# Patient Record
Sex: FEMALE | Race: WHITE | Employment: UNEMPLOYED | URBAN - METROPOLITAN AREA
[De-identification: names, ages, dates, MRNs, and addresses within clinical notes are randomized per-mention and may not be internally consistent; named-entity substitution may affect disease eponyms.]

---

## 2019-02-05 ENCOUNTER — OFFICE VISIT (OUTPATIENT)
Dept: SLEEP MEDICINE | Age: 24
End: 2019-02-05
Payer: MEDICAID

## 2019-02-05 VITALS
BODY MASS INDEX: 24.55 KG/M2 | SYSTOLIC BLOOD PRESSURE: 93 MMHG | DIASTOLIC BLOOD PRESSURE: 64 MMHG | WEIGHT: 130 LBS | HEIGHT: 61 IN | OXYGEN SATURATION: 96 % | HEART RATE: 75 BPM

## 2019-02-05 DIAGNOSIS — G47.21 SLEEP PHASE SYNDROME, DELAYED: ICD-10-CM

## 2019-02-05 DIAGNOSIS — G47.26 SHIFT WORK SLEEP DISORDER: Primary | ICD-10-CM

## 2019-02-05 PROCEDURE — G8420 CALC BMI NORM PARAMETERS: HCPCS | Performed by: PSYCHIATRY & NEUROLOGY

## 2019-02-05 PROCEDURE — 4004F PT TOBACCO SCREEN RCVD TLK: CPT | Performed by: PSYCHIATRY & NEUROLOGY

## 2019-02-05 PROCEDURE — G8484 FLU IMMUNIZE NO ADMIN: HCPCS | Performed by: PSYCHIATRY & NEUROLOGY

## 2019-02-05 PROCEDURE — 99204 OFFICE O/P NEW MOD 45 MIN: CPT | Performed by: PSYCHIATRY & NEUROLOGY

## 2019-02-05 PROCEDURE — G8427 DOCREV CUR MEDS BY ELIG CLIN: HCPCS | Performed by: PSYCHIATRY & NEUROLOGY

## 2019-02-05 RX ORDER — MODAFINIL 200 MG/1
200 TABLET ORAL DAILY
Qty: 30 TABLET | Refills: 5 | Status: SHIPPED | OUTPATIENT
Start: 2019-02-05 | End: 2019-05-07

## 2019-02-05 ASSESSMENT — ENCOUNTER SYMPTOMS
GASTROINTESTINAL NEGATIVE: 1
ALLERGIC/IMMUNOLOGIC NEGATIVE: 1
CHOKING: 1
EYES NEGATIVE: 1

## 2019-02-05 ASSESSMENT — SLEEP AND FATIGUE QUESTIONNAIRES
HOW LIKELY ARE YOU TO NOD OFF OR FALL ASLEEP WHEN YOU ARE A PASSENGER IN A CAR FOR AN HOUR WITHOUT A BREAK: 0
HOW LIKELY ARE YOU TO NOD OFF OR FALL ASLEEP IN A CAR, WHILE STOPPED FOR A FEW MINUTES IN TRAFFIC: 0
ESS TOTAL SCORE: 6
HOW LIKELY ARE YOU TO NOD OFF OR FALL ASLEEP WHILE SITTING AND TALKING TO SOMEONE: 0
HOW LIKELY ARE YOU TO NOD OFF OR FALL ASLEEP WHILE SITTING QUIETLY AFTER LUNCH WITHOUT ALCOHOL: 0
HOW LIKELY ARE YOU TO NOD OFF OR FALL ASLEEP WHILE SITTING AND READING: 0
HOW LIKELY ARE YOU TO NOD OFF OR FALL ASLEEP WHILE WATCHING TV: 1
HOW LIKELY ARE YOU TO NOD OFF OR FALL ASLEEP WHILE SITTING INACTIVE IN A PUBLIC PLACE: 2
HOW LIKELY ARE YOU TO NOD OFF OR FALL ASLEEP WHILE LYING DOWN TO REST IN THE AFTERNOON WHEN CIRCUMSTANCES PERMIT: 3

## 2019-05-06 ENCOUNTER — TELEPHONE (OUTPATIENT)
Dept: PULMONOLOGY | Age: 24
End: 2019-05-06

## 2019-05-07 ENCOUNTER — HOSPITAL ENCOUNTER (EMERGENCY)
Age: 24
Discharge: HOME | End: 2019-05-07
Attending: EMERGENCY MEDICINE
Payer: MEDICAID

## 2019-05-07 VITALS
HEART RATE: 97 BPM | BODY MASS INDEX: 24.55 KG/M2 | HEIGHT: 61 IN | DIASTOLIC BLOOD PRESSURE: 72 MMHG | OXYGEN SATURATION: 99 % | WEIGHT: 130 LBS | RESPIRATION RATE: 17 BRPM | TEMPERATURE: 97.4 F | SYSTOLIC BLOOD PRESSURE: 114 MMHG

## 2019-05-07 DIAGNOSIS — R10.2 ACUTE PELVIC PAIN, FEMALE: Primary | ICD-10-CM

## 2019-05-07 LAB
BACTERIA WET PREP: ABNORMAL
BACTERIA: ABNORMAL /HPF
BILIRUBIN URINE: ABNORMAL
BLOOD, URINE: ABNORMAL
CLARITY: CLEAR
CLUE CELLS: ABNORMAL
COLOR: YELLOW
EPITHELIAL CELLS WET PREP: ABNORMAL
EPITHELIAL CELLS, UA: ABNORMAL /HPF
GLUCOSE URINE: NEGATIVE MG/DL
HCG(URINE) PREGNANCY TEST: NEGATIVE
KETONES, URINE: 15 MG/DL
LEUKOCYTE ESTERASE, URINE: NEGATIVE
MICROSCOPIC EXAMINATION: YES
MUCUS: ABNORMAL /LPF
NITRITE, URINE: NEGATIVE
PH UA: 8.5 (ref 5–8)
PROTEIN UA: 30 MG/DL
RBC UA: ABNORMAL /HPF (ref 0–2)
RBC WET PREP: ABNORMAL
SOURCE WET PREP: ABNORMAL
SPECIFIC GRAVITY UA: 1.02 (ref 1–1.03)
TRICHOMONAS PREP: ABNORMAL
URINE REFLEX TO CULTURE: ABNORMAL
URINE TYPE: ABNORMAL
UROBILINOGEN, URINE: 2 E.U./DL
WBC UA: ABNORMAL /HPF (ref 0–5)
WBC WET PREP: ABNORMAL
YEAST WET PREP: ABNORMAL

## 2019-05-07 PROCEDURE — 99284 EMERGENCY DEPT VISIT MOD MDM: CPT

## 2019-05-07 PROCEDURE — 87591 N.GONORRHOEAE DNA AMP PROB: CPT

## 2019-05-07 PROCEDURE — 87491 CHLMYD TRACH DNA AMP PROBE: CPT

## 2019-05-07 PROCEDURE — 81001 URINALYSIS AUTO W/SCOPE: CPT

## 2019-05-07 PROCEDURE — 84703 CHORIONIC GONADOTROPIN ASSAY: CPT

## 2019-05-07 PROCEDURE — 87210 SMEAR WET MOUNT SALINE/INK: CPT

## 2019-05-07 RX ORDER — ALBUTEROL SULFATE 90 UG/1
2 AEROSOL, METERED RESPIRATORY (INHALATION)
COMMUNITY
Start: 2018-09-28 | End: 2020-03-28 | Stop reason: SDUPTHER

## 2019-05-07 RX ORDER — MIRTAZAPINE 15 MG/1
7.5 TABLET, FILM COATED ORAL
COMMUNITY
Start: 2019-04-05 | End: 2021-08-24

## 2019-05-07 RX ORDER — BUPROPION HYDROCHLORIDE 150 MG/1
150 TABLET ORAL
COMMUNITY
Start: 2019-04-05 | End: 2020-03-28 | Stop reason: SDUPTHER

## 2019-05-07 RX ORDER — CETIRIZINE HYDROCHLORIDE 10 MG/1
10 TABLET ORAL
COMMUNITY
Start: 2018-11-30 | End: 2020-03-28 | Stop reason: SDUPTHER

## 2019-05-07 ASSESSMENT — PAIN SCALES - GENERAL
PAINLEVEL_OUTOF10: 0

## 2019-05-07 NOTE — ED TRIAGE NOTES
Patient presents with complains of pain after intercourse at least 3 times in the past month. The pain lasts about 20 to 30 minutes and then stops as sudden as it started. Describes the pain as sharp cramps. She is concerned about the placement of her IUD. She has had the same sexual partner over the past year. No abnormal discharge or odor.

## 2019-05-07 NOTE — ED PROVIDER NOTES
(ZYRTEC) 10 MG TABLET    Take 10 mg by mouth    FLUTICASONE (FLONASE) 50 MCG/ACT NASAL SPRAY    1 spray by Nasal route daily    LAMOTRIGINE (LAMICTAL XR) 50 MG EXTENDED RELEASE TABLET    Take 50 mg by mouth daily    MIRTAZAPINE (REMERON) 15 MG TABLET    Take 7.5 mg by mouth    RANITIDINE HCL (ZANTAC PO)    Take by mouth       ALLERGIES     Patient has no known allergies. FAMILY HISTORY     History reviewed. No pertinent family history. No family status information on file. SOCIAL HISTORY      reports that she has been smoking. She has been smoking about 1.00 pack per day. She has never used smokeless tobacco. She reports that she drinks alcohol. She reports that she has current or past drug history. Drug: Marijuana. PHYSICAL EXAM    (up to 7 for level 4, 8 or more for level 5)     ED Triage Vitals [05/07/19 1625]   BP Temp Temp Source Pulse Resp SpO2 Height Weight   114/72 97.4 °F (36.3 °C) Oral 97 17 99 % 5' 1\" (1.549 m) 130 lb (59 kg)       Gen. : Alert well-appearing female in no acute distress. Head: Atraumatic and normocephalic. Eyes: No conjunctival injection. Pupils equal round reactive. ENT: Mahi Gordon is clear. Oropharynx is moist without erythema. Heart: Regular rate and rhythm. No murmurs gallops noted. Lungs: Breath sounds equal bilaterally and clear. Abdomen: Soft, nondistended, nontender. No masses organomegaly. Pelvic exam external genitalia is normal. Vaginal vault contains a very small amount of bloody mucus. The os is closed. The cervix is nontender. The uterus is nontender and not enlarged. The adnexa are nontender without masses. IUD strings are noted be in the normal position in the cervical os. Muscle skeletal: No extremity edema. Skin: Warm and dry, good turgor. No pallor. Neuro: Awake alert oriented. No focal motor deficits. Normal gait. Mental status: Normal affect.       DIAGNOSTIC RESULTS     RADIOLOGY:   Non-plain film images such as CT, Ultrasound and MRI are read by the radiologist. Isaías Sherman radiographic images are visualized and preliminarily interpreted by Mackenzie Saunders MD with the below findings:      Interpretation per the Radiologist below, if available at the time of this note:    No orders to display       LABS:  Labs Reviewed   WET PREP, GENITAL - Abnormal; Notable for the following components:       Result Value    Clue Cells, Wet Prep <1+ (*)     All other components within normal limits    Narrative:     Performed at:  [facility]  [address] [facility]   Phone [phone]   URINE RT REFLEX TO CULTURE - Abnormal; Notable for the following components:    Bilirubin Urine SMALL (*)     Ketones, Urine 15 (*)     Blood, Urine SMALL (*)     pH, UA 8.5 (*)     Protein, UA 30 (*)     Urobilinogen, Urine 2.0 (*)     All other components within normal limits    Narrative:     Performed at:  [facility]) [facility]  [address] [facility]   Phone [phone]   PREGNANCY, URINE    Narrative:     Performed at:  [place] [address] [facility]   Phone [phone]   MICROSCOPIC URINALYSIS       All other labs were within normal range or not returned as of this dictation. EMERGENCY DEPARTMENT COURSE and DIFFERENTIAL DIAGNOSIS/MDM:   Vitals:    Vitals:    05/07/19 1625   BP: 114/72   Pulse: 97   Resp: 17   Temp: 97.4 °F (36.3 °C)   TempSrc: Oral   SpO2: 99%   Weight: 130 lb (59 kg)   Height: 5' 1\" (1.549 m)       Differential includes but is not limited to post-orgasm/coital uterine cramps, urinary tract infection, pelvic inflammatory disease, uterine fibroids, Ectopic pregnancy, endometriosis, displaced IUD. Her pregnancy test is negative. I have a low index suspicion for ectopic pregnancy.  She has no evidence of uterine fibroids on exam. She has no palpable adnexal tenderness, no adnexal masses. Her exam does not show any significant discharge that would indicate infection/PID. Her wet prep is unremarkable. There is no evidence of urinary tract infection. The etiology of her postcoital pelvic pain is unclear. I did discuss the possibility that she could have intermittent torsion. I have asked her to return if her pain recurs and last more than 45-60 minutes. I have asked her to follow up with her primary care provider/gynecologist. I told her that they may want to do further workup including a pelvic ultrasound to rule out uterine fibroids, ovarian cyst, or other pelvic abnormalities. Her DNA probe is pending, I discussed this with the patient. If it's positive she'll be contacted for appropriate treatment. She's having no pain at all at this time. She has no tenderness. She'll be discharged home in stable condition. PROCEDURES:  None    FINAL IMPRESSION      1.  Acute pelvic pain, female          DISPOSITION/PLAN   DISPOSITION Decision To Discharge 05/07/2019 05:10:10 PM      PATIENT REFERRED TO:  Noe Lopez    In 1 week        DISCHARGE MEDICATIONS:  New Prescriptions    No medications on file       (Please note that portions of this note were completed with a voice recognition program.  Efforts were made to edit the dictations but occasionally words are mis-transcribed.)    Aravind Julio MD  Attending Emergency Physician        Vivica Gaucher, MD  05/07/19 3522

## 2019-05-08 LAB
C TRACH DNA GENITAL QL NAA+PROBE: NEGATIVE
N. GONORRHOEAE DNA: NEGATIVE

## 2019-05-22 ENCOUNTER — HOSPITAL ENCOUNTER (EMERGENCY)
Age: 24
Discharge: HOME | End: 2019-05-22
Attending: EMERGENCY MEDICINE
Payer: MEDICAID

## 2019-05-22 VITALS
HEIGHT: 61 IN | TEMPERATURE: 98.7 F | HEART RATE: 94 BPM | SYSTOLIC BLOOD PRESSURE: 121 MMHG | RESPIRATION RATE: 14 BRPM | OXYGEN SATURATION: 100 % | DIASTOLIC BLOOD PRESSURE: 80 MMHG | BODY MASS INDEX: 23.18 KG/M2 | WEIGHT: 122.8 LBS

## 2019-05-22 DIAGNOSIS — H60.503 ACUTE OTITIS EXTERNA OF BOTH EARS, UNSPECIFIED TYPE: ICD-10-CM

## 2019-05-22 DIAGNOSIS — H72.92 TYMPANIC MEMBRANE PERFORATION, LEFT: ICD-10-CM

## 2019-05-22 DIAGNOSIS — H65.03 BILATERAL ACUTE SEROUS OTITIS MEDIA, RECURRENCE NOT SPECIFIED: Primary | ICD-10-CM

## 2019-05-22 DIAGNOSIS — H69.83 DYSFUNCTION OF BOTH EUSTACHIAN TUBES: ICD-10-CM

## 2019-05-22 DIAGNOSIS — J41.0 SMOKERS' COUGH (HCC): ICD-10-CM

## 2019-05-22 PROCEDURE — 6370000000 HC RX 637 (ALT 250 FOR IP): Performed by: EMERGENCY MEDICINE

## 2019-05-22 PROCEDURE — 99282 EMERGENCY DEPT VISIT SF MDM: CPT

## 2019-05-22 RX ORDER — AMOXICILLIN 250 MG/1
500 CAPSULE ORAL ONCE
Status: COMPLETED | OUTPATIENT
Start: 2019-05-22 | End: 2019-05-22

## 2019-05-22 RX ORDER — NAPROXEN 500 MG/1
500 TABLET ORAL 2 TIMES DAILY
Qty: 14 TABLET | Refills: 0 | Status: SHIPPED | OUTPATIENT
Start: 2019-05-22 | End: 2019-08-03 | Stop reason: ALTCHOICE

## 2019-05-22 RX ORDER — NAPROXEN 250 MG/1
500 TABLET ORAL ONCE
Status: COMPLETED | OUTPATIENT
Start: 2019-05-22 | End: 2019-05-22

## 2019-05-22 RX ORDER — NEOMYCIN SULFATE, POLYMYXIN B SULFATE AND HYDROCORTISONE 10; 3.5; 1 MG/ML; MG/ML; [USP'U]/ML
3 SUSPENSION/ DROPS AURICULAR (OTIC) 3 TIMES DAILY
Qty: 1 BOTTLE | Refills: 0 | Status: SHIPPED | OUTPATIENT
Start: 2019-05-22 | End: 2019-05-29

## 2019-05-22 RX ORDER — BENZONATATE 200 MG/1
200 CAPSULE ORAL 3 TIMES DAILY PRN
Qty: 30 CAPSULE | Refills: 0 | Status: SHIPPED | OUTPATIENT
Start: 2019-05-22 | End: 2019-05-29

## 2019-05-22 RX ORDER — LANOLIN ALCOHOL/MO/W.PET/CERES
3 CREAM (GRAM) TOPICAL
COMMUNITY
Start: 2018-10-26

## 2019-05-22 RX ORDER — AMOXICILLIN 500 MG/1
500 CAPSULE ORAL 2 TIMES DAILY
Qty: 20 CAPSULE | Refills: 0 | Status: SHIPPED | OUTPATIENT
Start: 2019-05-22 | End: 2019-06-01

## 2019-05-22 RX ADMIN — NAPROXEN 500 MG: 250 TABLET ORAL at 08:00

## 2019-05-22 RX ADMIN — AMOXICILLIN 500 MG: 250 CAPSULE ORAL at 08:00

## 2019-05-22 ASSESSMENT — PAIN DESCRIPTION - ONSET: ONSET: AWAKENED FROM SLEEP

## 2019-05-22 ASSESSMENT — PAIN DESCRIPTION - FREQUENCY: FREQUENCY: CONTINUOUS

## 2019-05-22 ASSESSMENT — ENCOUNTER SYMPTOMS
DIARRHEA: 0
VOMITING: 0
ABDOMINAL PAIN: 0
SHORTNESS OF BREATH: 0
TROUBLE SWALLOWING: 0
NAUSEA: 0
VOICE CHANGE: 0

## 2019-05-22 ASSESSMENT — PAIN - FUNCTIONAL ASSESSMENT
PAIN_FUNCTIONAL_ASSESSMENT: 0-10
PAIN_FUNCTIONAL_ASSESSMENT: PREVENTS OR INTERFERES SOME ACTIVE ACTIVITIES AND ADLS
PAIN_FUNCTIONAL_ASSESSMENT: PREVENTS OR INTERFERES SOME ACTIVE ACTIVITIES AND ADLS

## 2019-05-22 ASSESSMENT — PAIN SCALES - GENERAL
PAINLEVEL_OUTOF10: 6

## 2019-05-22 ASSESSMENT — PAIN DESCRIPTION - DESCRIPTORS
DESCRIPTORS: SHARP;CONSTANT
DESCRIPTORS: SHARP;CONSTANT

## 2019-05-22 ASSESSMENT — PAIN DESCRIPTION - ORIENTATION
ORIENTATION: RIGHT;LEFT
ORIENTATION: RIGHT;LEFT

## 2019-05-22 ASSESSMENT — PAIN DESCRIPTION - LOCATION
LOCATION: EAR
LOCATION: EAR

## 2019-05-22 ASSESSMENT — PAIN DESCRIPTION - PAIN TYPE: TYPE: ACUTE PAIN

## 2019-05-22 NOTE — ED PROVIDER NOTES
[facility]  eMERGENCY dEPARTMENT eNCOUnter        Pt Name: Osvaldo Merlin  MRN: [id number]  [place] 1995  Date of evaluation: 5/22/2019  Provider: Ting Murphy MD  PCP: [address] [facility]  ED Attending: No att. providers found    [address] [place] [address]       Chief Complaint   Patient presents with    Ear Drainage     left ear drainage/popped since SUnday, right ear pain since last night       HISTORY OF PRESENT ILLNESS   (Location/Symptom, Timing/Onset, Context/Setting, Quality, Duration, Modifying Factors, Severity)  Note limiting factors. Osvaldo Merlin is a 21 y.o. female     Information obtained from patient, nursing staff, chart. Pain level VI/X  Pain medication offer. Patient comes in with a long history of having ear problems. She states that her left ear perfect and drained on Sunday. Then she states that her right ear started hurting and feeling pressure. Patient has had multiple ear infections in the past. She also has some tragal tenderness with this. No bleeding currently. Slight decrease in hearing bilaterally. Otherwise no other complaints noted. See chart for details. History of PE tubes in ears. Nursing Notes were all reviewed and agreed with or any disagreements were addressed  in the HPI. REVIEW OF SYSTEMS    (2-9 systems for level 4, 10 or more for level 5)     Review of Systems   Constitutional: Negative for chills and fever. HENT: Positive for ear discharge, ear pain, hearing loss and tinnitus. Negative for drooling, nosebleeds, trouble swallowing and voice change. Respiratory: Negative for shortness of breath. Cardiovascular: Negative for chest pain. Gastrointestinal: Negative for abdominal pain, diarrhea, nausea and vomiting. Musculoskeletal: Negative for neck pain and neck stiffness. Skin: Negative for rash. Neurological: Negative for dizziness, speech difficulty and light-headedness.    Hematological: Negative for adenopathy. Psychiatric/Behavioral: The patient is not nervous/anxious. Positives and Pertinent negatives as per HPI. Except as noted abovein the ROS, all other systems were reviewed and negative. PAST MEDICAL HISTORY     Past Medical History:   Diagnosis Date    ADHD (attention deficit hyperactivity disorder)     Asthma          SURGICAL HISTORY     Past Surgical History:   Procedure Laterality Date    TYMPANOSTOMY TUBE PLACEMENT           [address]       Discharge Medication List as of 5/22/2019  8:06 AM      CONTINUE these medications which have NOT CHANGED    Details   melatonin 3 MG TABS tablet Take 3 mg by mouthHistorical Med      cetirizine (ZYRTEC) 10 MG tablet Take 10 mg by mouthHistorical Med      albuterol sulfate HFA (VENTOLIN HFA) 108 (90 Base) MCG/ACT inhaler Inhale 2 puffs into the lungsHistorical Med      buPROPion (WELLBUTRIN XL) 150 MG extended release tablet Take 150 mg by mouthHistorical Med      mirtazapine (REMERON) 15 MG tablet Take 7.5 mg by mouthHistorical Med      lamoTRIgine (LAMICTAL XR) 50 MG extended release tablet Take 50 mg by mouth dailyHistorical Med      RaNITidine HCl (ZANTAC PO) Take by mouthHistorical Med      fluticasone (FLONASE) 50 MCG/ACT nasal spray 1 spray by Nasal route dailyHistorical Med               ALLERGIES     Patient has no known allergies. FAMILYHISTORY     History reviewed. No pertinent family history.        SOCIAL HISTORY       Social History     Socioeconomic History    Marital status: Single     Spouse name: None    Number of children: None    Years of education: None    Highest education level: None   Occupational History    None   Social Needs    Financial resource strain: None    Food insecurity:     Worry: None     Inability: None    Transportation needs:     Medical: None     Non-medical: None   Tobacco Use    Smoking status: Current Every Day Smoker     Packs/day: 1.00    Smokeless tobacco: Never Used Substance and Sexual Activity    Alcohol use: Yes     Comment: once a month    Drug use: Yes     Types: Marijuana     Comment: not use marijuana for a long time    Sexual activity: Yes     Partners: Male   Lifestyle    Physical activity:     Days per week: None     Minutes per session: None    Stress: None   Relationships    Social connections:     Talks on phone: None     Gets together: None     Attends [employer] service: None     Active member of club or organization: None     Attends meetings of clubs or organizations: None     Relationship status: None    Intimate partner violence:     Fear of current or ex partner: None     Emotionally abused: None     Physically abused: None     Forced sexual activity: None   Other Topics Concern    None   Social History Narrative    ** Merged History Encounter **            SCREENINGS             PHYSICAL EXAM    (up to 7 for level 4, 8 or more for level 5)     ED Triage Vitals [05/22/19 0733]   BP Temp Temp Source Pulse Resp SpO2 Height Weight   121/80 98.7 °F (37.1 °C) Oral 94 14 100 % 5' 1\" (1.549 m) 122 lb 12.7 oz (55.7 kg)       Physical Exam   Constitutional: She is oriented to person, place, and time. She appears well-developed and well-nourished. HENT:   Head: Normocephalic and atraumatic. Nose: Nose normal.   Mouth/Throat: Oropharynx is clear and moist.   The patient has bilateral serous otitis media but may have a perforation on the left-hand side. Patient stated already drained. I do not see any signs of any drainage currently. Patient also has some popping in the ears so she probably most likely has some eustachian tube dysfunction. Otitis externa. Bilateral.   Eyes: Pupils are equal, round, and reactive to light. Conjunctivae and EOM are normal.   Neck: Normal range of motion. Neck supple. Cardiovascular: Normal rate, regular rhythm, normal heart sounds and intact distal pulses.    Pulmonary/Chest: Effort normal and breath sounds normal. Abdominal: Soft. Bowel sounds are normal.   Musculoskeletal: Normal range of motion. Neurological: She is alert and oriented to person, place, and time. She has normal reflexes. Skin: Skin is warm and dry. Capillary refill takes less than 2 seconds. Psychiatric: She has a normal mood and affect. Nursing note and vitals reviewed. DIAGNOSTIC RESULTS   LABS:    Labs Reviewed - No data to display    All other labs were within normal range or not returned as of this dictation. EKG: All EKG's are interpreted by the Emergency Department Physician who either signs orCo-signs this chart in the absence of a cardiologist.  Please see their note for interpretation of EKG. RADIOLOGY:   Non-plain film images such as CT, Ultrasound and MRI are read by the radiologist. Plain radiographic images are visualized andpreliminarily interpreted by the  ED Provider with the below findings:        Interpretation per the Radiologist below, if available at the time ofthis note:    No orders to display     No results found. PROCEDURES   Unless otherwise noted below, none     Procedures    CRITICAL CARE TIME   N/A    CONSULTS:  None      EMERGENCY DEPARTMENT COURSE and DIFFERENTIAL DIAGNOSIS/MDM:   Vitals:    Vitals:    05/22/19 0733   BP: 121/80   Pulse: 94   Resp: 14   Temp: 98.7 °F (37.1 °C)   TempSrc: Oral   SpO2: 100%   Weight: 122 lb 12.7 oz (55.7 kg)   Height: 5' 1\" (1.549 m)       Patient was given the following medications:  Medications   amoxicillin (AMOXIL) capsule 500 mg (500 mg Oral Given 5/22/19 0800)   naproxen (NAPROSYN) tablet 500 mg (500 mg Oral Given 5/22/19 0800)         Otitis media versus otitis externa versus perforation versus tendinitis versus eustachian tube dysfunction versus smoker's cough    Patient has a long history of having ear problems.  Comes in eye has bilateral otitis media as long with irritated external auditory canal. She's been having some eustachian tube dysfunction she's been [id number] otic suspension Place 3 drops into the right ear 3 times daily for 7 days, Disp-1 Bottle, R-0Print             DISCONTINUED MEDICATIONS:  Discharge Medication List as of 5/22/2019  8:06 AM                 (Please note that portions of this note were completed with a voice recognition program.  Efforts were made to edit the dictations but occasionally words aremis-transcribed.)    Brent Tolentino MD (electronically signed)          Myles Denton MD  05/22/19 0151

## 2019-05-22 NOTE — ED NOTES
Discharge and education instructions reviewed. Patient verbalized understanding, teach back successful. Patient denied questions at this time. Instructed to follow up with PCP and or return to ED if symptoms worsen.         Ainsley Hernandez RN  05/22/19 9580

## 2019-08-03 ENCOUNTER — HOSPITAL ENCOUNTER (EMERGENCY)
Age: 24
Discharge: HOME | End: 2019-08-03
Attending: EMERGENCY MEDICINE
Payer: MEDICAID

## 2019-08-03 VITALS
DIASTOLIC BLOOD PRESSURE: 61 MMHG | HEART RATE: 80 BPM | RESPIRATION RATE: 16 BRPM | WEIGHT: 126.1 LBS | SYSTOLIC BLOOD PRESSURE: 109 MMHG | HEIGHT: 61 IN | OXYGEN SATURATION: 98 % | TEMPERATURE: 98.1 F | BODY MASS INDEX: 23.81 KG/M2

## 2019-08-03 DIAGNOSIS — R25.2 BILATERAL LEG CRAMPS: Primary | ICD-10-CM

## 2019-08-03 PROCEDURE — 99283 EMERGENCY DEPT VISIT LOW MDM: CPT

## 2019-08-03 RX ORDER — CYCLOBENZAPRINE HCL 10 MG
10 TABLET ORAL 3 TIMES DAILY PRN
Qty: 15 TABLET | Refills: 0 | Status: SHIPPED | OUTPATIENT
Start: 2019-08-03 | End: 2019-08-08

## 2019-08-03 RX ORDER — CYCLOBENZAPRINE HCL 10 MG
10 TABLET ORAL 3 TIMES DAILY PRN
Qty: 15 TABLET | Refills: 0 | Status: SHIPPED | OUTPATIENT
Start: 2019-08-03 | End: 2019-08-03 | Stop reason: SDUPTHER

## 2019-08-03 ASSESSMENT — PAIN DESCRIPTION - ORIENTATION: ORIENTATION: RIGHT;LEFT

## 2019-08-03 ASSESSMENT — PAIN DESCRIPTION - DESCRIPTORS: DESCRIPTORS: ACHING

## 2019-08-03 ASSESSMENT — PAIN SCALES - GENERAL
PAINLEVEL_OUTOF10: 2
PAINLEVEL_OUTOF10: 2

## 2019-08-03 ASSESSMENT — PAIN DESCRIPTION - LOCATION: LOCATION: LEG

## 2019-08-03 ASSESSMENT — PAIN DESCRIPTION - PAIN TYPE: TYPE: ACUTE PAIN

## 2019-08-03 NOTE — ED PROVIDER NOTES
[facility]  eMERGENCY dEPARTMENT eNCOUnter      Pt Name: Jennifer Cormier  MRN: [id number]  [place] 1995  Date of evaluation: 8/3/2019  Provider: MD Jennifer Fiore       Chief Complaint   Patient presents with    Leg Pain     bilateral leg pain since running out of Lamictal 10 days ago         HISTORY OF PRESENT ILLNESS  (Location/Symptom, Timing/Onset, Context/Setting, Quality, Duration, Modifying Factors, Severity.)   Jennifer Cormier is a 21 y.o. female who presents to the emergency department complaining of bilateral leg cramps since she ran out of her Lamictal.  She is on Lamictal for depression. She states when she starts Lamictal she gets leg cramps for a few days, and when she runs out of it she gets leg cramps. She is waiting for her insurance to reinstate this week to refill her Lamictal, but in the meantime she is having the leg cramps that she typically gets when she runs out of her medication. No injury. No swelling. No other complaints. Nursing Notes were reviewed and I agree. REVIEW OF SYSTEMS    (2-9 systems for level 4, 10 or more for level 5)     General: No fever or chills. Cardiovascular: No chest pain. Pulmonary: No shortness of breath. Muscular skeletal: Cramps in her posterior thighs and calves. Skin: No rash. No erythema. Except as noted above the remainder of the review of systems was reviewed and negative.        PAST MEDICAL HISTORY         Diagnosis Date    ADHD (attention deficit hyperactivity disorder)     Anxiety     Asthma     Depression        SURGICAL HISTORY           Procedure Laterality Date    TYMPANOSTOMY TUBE PLACEMENT         CURRENT MEDICATIONS       Previous Medications    ALBUTEROL SULFATE HFA (VENTOLIN HFA) 108 (90 BASE) MCG/ACT INHALER    Inhale 2 puffs into the lungs    BUPROPION (WELLBUTRIN XL) 150 MG EXTENDED RELEASE TABLET    Take 150 mg by mouth    CETIRIZINE (ZYRTEC) 10 MG TABLET    Take 10 mg by mouth    FLUTICASONE (FLONASE) 50 MCG/ACT NASAL SPRAY    1 spray by Nasal route daily    LAMOTRIGINE (LAMICTAL XR) 50 MG EXTENDED RELEASE TABLET    Take 50 mg by mouth daily    LEVONORGESTREL (MIRENA) IUD 52 MG    1 each by Intrauterine route    MELATONIN 3 MG TABS TABLET    Take 3 mg by mouth    MIRTAZAPINE (REMERON) 15 MG TABLET    Take 7.5 mg by mouth    RANITIDINE HCL (ZANTAC PO)    Take by mouth       ALLERGIES     Patient has no known allergies. FAMILY HISTORY     History reviewed. No pertinent family history. No family status information on file. SOCIAL HISTORY      reports that she has been smoking. She has been smoking about 0.50 packs per day. She has never used smokeless tobacco. She reports that she drinks alcohol. She reports that she has current or past drug history. Drug: Marijuana. PHYSICAL EXAM    (up to 7 for level 4, 8 or more for level 5)     ED Triage Vitals [08/03/19 1155]   BP Temp Temp Source Pulse Resp SpO2 Height Weight   109/61 98.1 °F (36.7 °C) Oral 80 16 98 % 5' 1\" (1.549 m) 126 lb 1.7 oz (57.2 kg)       General: An alert white female no acute distress. Head: Atraumatic and normocephalic. Eyes: Pupils equal round reactive. Extraocular movements are intact. ENT: Roselind Litten is clear. Oropharynx is moist without erythema. Heart: Regular rate and rhythm. No murmurs or gallops noted. Lungs: Breath sounds equal bilaterally and clear. Abdomen: Soft, nondistended, nontender. Musculoskeletal: No lower extremity edema. Intact symmetrical distal pulses. She has some minimal muscular tenderness in her posterior calves and thighs bilaterally. No joint swelling. Normal range of motion without significant pain. Skin: Warm and dry, good turgor. No erythema. No rash. No bruising or signs of trauma. Neuro: Intact symmetrical motor function the upper and lower extremities with normal gait.       DIAGNOSTIC RESULTS     RADIOLOGY:   Non-plain film images such as CT, Ultrasound and MRI are read by the radiologist. Plain radiographic images are visualized and preliminarily interpreted by Tawny Cali MD with the below findings:        Interpretation per the Radiologist below, if available at the time of this note:    No orders to display       LABS:  Labs Reviewed - No data to display    All other labs were within normal range or not returned as of this dictation. EMERGENCY DEPARTMENT COURSE and DIFFERENTIAL DIAGNOSIS/MDM:   Vitals:    Vitals:    08/03/19 1155   BP: 109/61   Pulse: 80   Resp: 16   Temp: 98.1 °F (36.7 °C)   TempSrc: Oral   SpO2: 98%   Weight: 126 lb 1.7 oz (57.2 kg)   Height: 5' 1\" (1.549 m)       Patient is having muscle cramps which she typically has when she starts and/or runs out of her medication. She is going to be able to restart her medication when her insurance is reinstated this week. She has a prescription for her Lamictal at the pharmacy. In the meantime I am going to write for some short-term Flexeril and recommending using ibuprofen with the Flexeril for her muscle cramps. She requested a work note for today. She will follow-up with her primary care provider in 3 days if not improved. There is no evidence of edema or localized calf tenderness which would make me suspect deep vein thrombosis. She has some diffuse muscular tenderness in her posterior thighs and calves bilaterally. There is no evidence of ischemia, she has good distal pulses. There are no open wounds, no erythema to make me suspect infection. There is no history of trauma to suggest muscular injury or bony injury. There is no joint swelling to make me suspect arthritis and/or joint infection. PROCEDURES:  None    FINAL IMPRESSION      1.  Bilateral leg cramps          DISPOSITION/PLAN   DISPOSITION Decision To Discharge 08/03/2019 12:09:05 PM      PATIENT REFERRED TO:  [facility]    In 3 days  If symptoms worsen      DISCHARGE MEDICATIONS:  New

## 2019-10-13 ENCOUNTER — HOSPITAL ENCOUNTER (EMERGENCY)
Age: 24
Discharge: HOME | End: 2019-10-13
Attending: EMERGENCY MEDICINE
Payer: MEDICAID

## 2019-10-13 VITALS
BODY MASS INDEX: 22.64 KG/M2 | DIASTOLIC BLOOD PRESSURE: 66 MMHG | RESPIRATION RATE: 16 BRPM | TEMPERATURE: 98.2 F | SYSTOLIC BLOOD PRESSURE: 103 MMHG | HEIGHT: 61 IN | WEIGHT: 119.93 LBS | OXYGEN SATURATION: 99 % | HEART RATE: 66 BPM

## 2019-10-13 DIAGNOSIS — J02.9 ACUTE PHARYNGITIS, UNSPECIFIED ETIOLOGY: ICD-10-CM

## 2019-10-13 DIAGNOSIS — R11.2 NON-INTRACTABLE VOMITING WITH NAUSEA, UNSPECIFIED VOMITING TYPE: Primary | ICD-10-CM

## 2019-10-13 LAB
BILIRUBIN URINE: NEGATIVE
BLOOD, URINE: NEGATIVE
CLARITY: CLEAR
COLOR: YELLOW
GLUCOSE URINE: NEGATIVE MG/DL
HCG(URINE) PREGNANCY TEST: NEGATIVE
KETONES, URINE: NEGATIVE MG/DL
LEUKOCYTE ESTERASE, URINE: NEGATIVE
MICROSCOPIC EXAMINATION: ABNORMAL
NITRITE, URINE: NEGATIVE
PH UA: 8.5 (ref 5–8)
PROTEIN UA: NEGATIVE MG/DL
SPECIFIC GRAVITY UA: 1.01 (ref 1–1.03)
URINE REFLEX TO CULTURE: ABNORMAL
URINE TYPE: ABNORMAL
UROBILINOGEN, URINE: 0.2 E.U./DL

## 2019-10-13 PROCEDURE — 99283 EMERGENCY DEPT VISIT LOW MDM: CPT

## 2019-10-13 PROCEDURE — 81003 URINALYSIS AUTO W/O SCOPE: CPT

## 2019-10-13 PROCEDURE — 84703 CHORIONIC GONADOTROPIN ASSAY: CPT

## 2019-10-13 RX ORDER — AMOXICILLIN 500 MG/1
500 CAPSULE ORAL 3 TIMES DAILY
Qty: 30 CAPSULE | Refills: 0 | Status: SHIPPED | OUTPATIENT
Start: 2019-10-13 | End: 2019-10-23

## 2020-03-28 ENCOUNTER — HOSPITAL ENCOUNTER (EMERGENCY)
Age: 25
Discharge: HOME | End: 2020-03-28
Attending: EMERGENCY MEDICINE
Payer: MEDICAID

## 2020-03-28 ENCOUNTER — APPOINTMENT (OUTPATIENT)
Dept: GENERAL RADIOLOGY | Age: 25
End: 2020-03-28
Payer: MEDICAID

## 2020-03-28 VITALS
OXYGEN SATURATION: 98 % | HEART RATE: 80 BPM | WEIGHT: 117.5 LBS | TEMPERATURE: 98 F | RESPIRATION RATE: 18 BRPM | DIASTOLIC BLOOD PRESSURE: 72 MMHG | HEIGHT: 61 IN | SYSTOLIC BLOOD PRESSURE: 103 MMHG | BODY MASS INDEX: 22.19 KG/M2

## 2020-03-28 LAB
A/G RATIO: 2.1 (ref 1.1–2.2)
ALBUMIN SERPL-MCNC: 4.2 G/DL (ref 3.4–5)
ALP BLD-CCNC: 91 U/L (ref 40–129)
ALT SERPL-CCNC: 12 U/L (ref 10–40)
ANION GAP SERPL CALCULATED.3IONS-SCNC: 13 MMOL/L (ref 3–16)
AST SERPL-CCNC: 29 U/L (ref 15–37)
BASOPHILS ABSOLUTE: 0 K/UL (ref 0–0.2)
BASOPHILS RELATIVE PERCENT: 0.4 %
BILIRUB SERPL-MCNC: 0.3 MG/DL (ref 0–1)
BUN BLDV-MCNC: 9 MG/DL (ref 7–20)
CALCIUM SERPL-MCNC: 9 MG/DL (ref 8.3–10.6)
CHLORIDE BLD-SCNC: 107 MMOL/L (ref 99–110)
CO2: 22 MMOL/L (ref 21–32)
CREAT SERPL-MCNC: 0.7 MG/DL (ref 0.6–1.1)
EOSINOPHILS ABSOLUTE: 0.3 K/UL (ref 0–0.6)
EOSINOPHILS RELATIVE PERCENT: 2.8 %
GFR AFRICAN AMERICAN: >60
GFR NON-AFRICAN AMERICAN: >60
GLOBULIN: 2 G/DL
GLUCOSE BLD-MCNC: 107 MG/DL (ref 70–99)
HCG(URINE) PREGNANCY TEST: NEGATIVE
HCT VFR BLD CALC: 41.6 % (ref 36–48)
HEMOGLOBIN: 13.7 G/DL (ref 12–16)
LYMPHOCYTES ABSOLUTE: 3.3 K/UL (ref 1–5.1)
LYMPHOCYTES RELATIVE PERCENT: 34.7 %
MCH RBC QN AUTO: 30.5 PG (ref 26–34)
MCHC RBC AUTO-ENTMCNC: 33 G/DL (ref 31–36)
MCV RBC AUTO: 92.5 FL (ref 80–100)
MONOCYTES ABSOLUTE: 0.6 K/UL (ref 0–1.3)
MONOCYTES RELATIVE PERCENT: 6.8 %
NEUTROPHILS ABSOLUTE: 5.2 K/UL (ref 1.7–7.7)
NEUTROPHILS RELATIVE PERCENT: 55.3 %
PDW BLD-RTO: 12.6 % (ref 12.4–15.4)
PLATELET # BLD: 225 K/UL (ref 135–450)
PMV BLD AUTO: 9.2 FL (ref 5–10.5)
POTASSIUM REFLEX MAGNESIUM: 4.1 MMOL/L (ref 3.5–5.1)
RBC # BLD: 4.5 M/UL (ref 4–5.2)
SODIUM BLD-SCNC: 142 MMOL/L (ref 136–145)
TOTAL PROTEIN: 6.2 G/DL (ref 6.4–8.2)
TROPONIN: <0.01 NG/ML
WBC # BLD: 9.4 K/UL (ref 4–11)

## 2020-03-28 PROCEDURE — 71045 X-RAY EXAM CHEST 1 VIEW: CPT

## 2020-03-28 PROCEDURE — 80053 COMPREHEN METABOLIC PANEL: CPT

## 2020-03-28 PROCEDURE — 84703 CHORIONIC GONADOTROPIN ASSAY: CPT

## 2020-03-28 PROCEDURE — 99285 EMERGENCY DEPT VISIT HI MDM: CPT

## 2020-03-28 PROCEDURE — 93005 ELECTROCARDIOGRAM TRACING: CPT | Performed by: EMERGENCY MEDICINE

## 2020-03-28 PROCEDURE — 36415 COLL VENOUS BLD VENIPUNCTURE: CPT

## 2020-03-28 PROCEDURE — 85025 COMPLETE CBC W/AUTO DIFF WBC: CPT

## 2020-03-28 PROCEDURE — 84484 ASSAY OF TROPONIN QUANT: CPT

## 2020-03-28 RX ORDER — ALBUTEROL SULFATE 90 UG/1
2 AEROSOL, METERED RESPIRATORY (INHALATION) EVERY 4 HOURS PRN
Qty: 1 INHALER | Refills: 0 | Status: SHIPPED | OUTPATIENT
Start: 2020-03-28 | End: 2021-08-24

## 2020-03-28 RX ORDER — CETIRIZINE HYDROCHLORIDE 10 MG/1
10 TABLET ORAL DAILY
Qty: 28 TABLET | Refills: 0 | Status: SHIPPED | OUTPATIENT
Start: 2020-03-28 | End: 2021-08-24

## 2020-03-28 RX ORDER — BUPROPION HYDROCHLORIDE 150 MG/1
150 TABLET ORAL EVERY MORNING
Qty: 28 TABLET | Refills: 0 | Status: SHIPPED | OUTPATIENT
Start: 2020-03-28 | End: 2021-08-24

## 2020-03-28 RX ORDER — LAMOTRIGINE 50 MG/1
50 TABLET, EXTENDED RELEASE ORAL DAILY
Qty: 28 TABLET | Refills: 0 | Status: SHIPPED | OUTPATIENT
Start: 2020-03-28 | End: 2021-08-24

## 2020-03-28 ASSESSMENT — PAIN DESCRIPTION - LOCATION: LOCATION: CHEST

## 2020-03-28 ASSESSMENT — PAIN SCALES - GENERAL
PAINLEVEL_OUTOF10: 0
PAINLEVEL_OUTOF10: 4

## 2020-03-28 ASSESSMENT — PAIN DESCRIPTION - DESCRIPTORS: DESCRIPTORS: PRESSURE

## 2020-03-28 ASSESSMENT — PAIN DESCRIPTION - PAIN TYPE: TYPE: ACUTE PAIN

## 2020-03-28 ASSESSMENT — PAIN DESCRIPTION - FREQUENCY: FREQUENCY: CONTINUOUS

## 2020-03-28 NOTE — ED NOTES
Patient walked to bathroom and back to room. Her gait was steady. No coughing or sob noted.       Donnell Beaver RN  03/28/20 1000

## 2020-03-28 NOTE — ED PROVIDER NOTES
Emergency Physician Note    Chief Complaint  Chest Pain (patient with c/o chest pain/pressure, and cough x 3 days. She denies fever and sob )       History of Present Illness  Michael Masters is a 31 y.o. female who presents to the ED for chest pain, cough. Patient has been having the symptoms for 3 days. She reports the chest pain is midsternal, nonradiating, pressure-like, constant. It is not pleuritic in nature. She states the cough is been nonproductive. She does have sick contacts at home. Patient does smoke. She normally uses an inhaler for shortness of breath but she has been out of her inhaler for a while. She is also has not had any of her other medications for the past 3 weeks. She states that yes she will get chest discomfort associated with her asthma exacerbations but this chest pain \"feels different\". [place] any history of DVT/PE, denies any lower extremity pain/swelling, denies any recent surgical procedures/admissions, she uses a Mirena for birth control, no history of cancer. Denies fever, chills, malaise, shortness of breath, cough, abdominal pain, nausea, vomiting, diarrhea, headache, sore throat, dysuria, back pain, rash. No palliative/provocative factors. Unless otherwise stated in this report or unable to obtain because of the patient's clinical or mental status as evidenced by the medical record, this patient's positive and negative responses for review of systems, constitutional, psych, eyes, ENT, cardiovascular, respiratory, gastrointestinal, neurological, genitourinary, musculoskeletal, integument systems and systems related to the presenting problem are either stated in the preceding paragraph or were not pertinent or were negative for the symptoms and/or complaints related to the medical problem. I have reviewed the following from the nursing documentation:      Prior to Admission medications    Medication Sig Start Date End Date Taking?  Authorizing Provider   albuterol sulfate HFA (VENTOLIN HFA) 108 (90 Base) MCG/ACT inhaler Inhale 2 puffs into the lungs every 4 hours as needed for Wheezing or Shortness of Breath 3/28/20  Yes Abhay Linares MD   buPROPion (WELLBUTRIN XL) 150 MG extended release tablet Take 1 tablet by mouth every morning 3/28/20  Yes Abhay Linares MD   cetirizine (ZYRTEC) 10 MG tablet Take 1 tablet by mouth daily 3/28/20  Yes Abhay Linares MD   lamoTRIgine (LAMICTAL XR) 50 MG extended release tablet Take 1 tablet by mouth daily for 28 days 3/28/20 4/25/20 Yes Abhay Linares MD   levonorgestrel [facility]) IUD 52 mg 1 each by Intrauterine route 7/27/18 7/27/23 Yes Historical Provider, MD   melatonin 3 MG TABS tablet Take 3 mg by mouth 10/26/18  Yes Historical Provider, MD   mirtazapine (REMERON) 15 MG tablet Take 7.5 mg by mouth 4/5/19  Yes Historical Provider, MD   RaNITidine HCl (ZANTAC PO) Take by mouth   Yes Historical Provider, MD   fluticasone (FLONASE) 50 MCG/ACT nasal spray 1 spray by Nasal route daily   Yes Historical Provider, MD       Allergies as of 03/28/2020    (No Known Allergies)       Past Medical History:   Diagnosis Date    ADHD (attention deficit hyperactivity disorder)     Allergies     Anxiety     Asthma     Depression         Surgical History:   Past Surgical History:   Procedure Laterality Date    TYMPANOSTOMY TUBE PLACEMENT          Family History:  History reviewed. No pertinent family history.     Social History     Socioeconomic History    Marital status: Single     Spouse name: Not on file    Number of children: Not on file    Years of education: Not on file    Highest education level: Not on file   Occupational History    Not on file   Social Needs    Financial resource strain: Not on file    Food insecurity     Worry: Not on file     Inability: Not on file    Transportation needs     Medical: Not on file     Non-medical: Not on file   Tobacco Use    Smoking status: Current Every Day 0    cetirizine (ZYRTEC) 10 MG tablet     Sig: Take 1 tablet by mouth daily     Dispense:  28 tablet     Refill:  0    lamoTRIgine (LAMICTAL XR) 50 MG extended release tablet     Sig: Take 1 tablet by mouth daily for 28 days     Dispense:  28 tablet     Refill:  0       ED course notes for this visit  ED Course as of Mar 28 0443   Sat Mar 28, 2020   0442 I discussed the lab results with the patient. She reports she is feeling much better, she believes this is anxiety related. [SG]      ED Course User Index  [SG] Riki Keller MD         - Patient seen and evaluated in room 4    Wells Criteria: To assess patient for likelihood of a pulmonary embolism. Physical findings suggestive of DVT (unilateral leg swelling, calf or thigh tenderness):+0 No  No alternative diagnosis better explains the illness:+0 No  Tachycardia with pulse > 100:+0 No  Immobilization (?3 days) or surgery in the previous four weeks:+0 No  Prior history of DVT or pulmonary embolism:+0 No  Presence of hemoptysis:+0 No  Presence of malignancy:+0 No    Pulmonary embolism risk score interpretation: 0. This falls under the following category: Score of < 2, which indicates a low probability    PERC Rule:  Applicable in this patient who has low clinical suspicion for pulmonary embolism. Age < 48years old: Yes  Heart rate < 100 bpm: Yes  Oxygen saturation > 95%: Yes  Hemoptysis: No  Exogenous estrogen use: No  Prior history of DVT or PE: No  Unilateral leg swelling: No  Surgery or significant trauma in the past 4 weeks: No    Based on the above, PE can effectively be ruled out without further testing. I completed a structured, evidence-based clinical evaluation to screen for pulmonary embolus in this patient. The evidence indicates that the patient is very low risk for pulmonary embolus, and this is consistent with my clinical intuition.  The risk of further testing, imaging or hospitalization is likely higher than the risk of the patient normal EKG   \"Nonspecific changes\" = repolarization abnormalities, nonspecific T wave changes, nonspecific ST segment depression elevation, bundle branch blocks (even if old), pacemaker rhythm, LVH, early repolarization, digoxin effect   Age: [de-identified] for age <45 years  Risk factors (includes HLD, HTN, DM, tobacco use, obesity, and +FHx): +1 for 1-2 risk factors  \"Smoking\" = Current or within the past month, \"family history\" = parents, siblings, children with diagnoses of CAD  \"Obesity\" = BMI > 30  Initial troponin: +0 for negative troponin (0-0.04)    Heart score: 1. This falls under the following category: Score of 0-3, which indicates a very low risk (0.9-1.7%) for major adverse cardiac event and supports early discharge. I completed a HEART Score or HEART PATHWAY to screen for Acute Coronary Syndrome (ACS) in this patient. The evidence indicates that the patient is very low risk for ACS and this is consistent with my clinical intuition. The risk of further workup or hospitalization is likely higher than the risk of the patient having an ACS. It is, therefore, in the patient¢s best interest not to do additional emergent testing or be hospitalized. I have discussed with the patient my clinical impression and the result of the HEART SCORE or HEART PATHWAY to screen for ACS, as well as the risks of further testing and hospitalization. The HEART SCORE or HEART PATHWAY shows that the risk for ACS is less than 2% or 1%, respectively. Although the risk of ACS has not been eliminated, the risks of further testing or hospitalization likely exceed the benefit, and the patient declines further emergent evaluation or hospitalization for ACS. This is a very pleasant patient with chest pain. On physical exam, patient does not have any abnormal heart or lung sounds.   The work up in the ED indicates patient is without significant evidence of acute coronary syndrome, pulmonary embolism, thoracic aortic dissection, pericarditis,

## 2020-03-29 LAB
EKG ATRIAL RATE: 64 BPM
EKG DIAGNOSIS: NORMAL
EKG P AXIS: 25 DEGREES
EKG P-R INTERVAL: 146 MS
EKG Q-T INTERVAL: 390 MS
EKG QRS DURATION: 80 MS
EKG QTC CALCULATION (BAZETT): 402 MS
EKG R AXIS: 75 DEGREES
EKG T AXIS: 57 DEGREES
EKG VENTRICULAR RATE: 64 BPM

## 2020-03-29 PROCEDURE — 93010 ELECTROCARDIOGRAM REPORT: CPT | Performed by: INTERNAL MEDICINE

## 2021-08-24 ENCOUNTER — APPOINTMENT (OUTPATIENT)
Dept: CT IMAGING | Age: 26
End: 2021-08-24
Payer: MEDICAID

## 2021-08-24 ENCOUNTER — HOSPITAL ENCOUNTER (EMERGENCY)
Age: 26
Discharge: HOME | End: 2021-08-24
Attending: EMERGENCY MEDICINE
Payer: MEDICAID

## 2021-08-24 ENCOUNTER — APPOINTMENT (OUTPATIENT)
Dept: GENERAL RADIOLOGY | Age: 26
End: 2021-08-24
Payer: MEDICAID

## 2021-08-24 VITALS
HEART RATE: 87 BPM | TEMPERATURE: 98.2 F | SYSTOLIC BLOOD PRESSURE: 96 MMHG | OXYGEN SATURATION: 98 % | RESPIRATION RATE: 16 BRPM | DIASTOLIC BLOOD PRESSURE: 35 MMHG | WEIGHT: 104.94 LBS | BODY MASS INDEX: 19.31 KG/M2 | HEIGHT: 62 IN

## 2021-08-24 DIAGNOSIS — R93.89 ABNORMAL X-RAY: ICD-10-CM

## 2021-08-24 DIAGNOSIS — R09.89 FOREIGN BODY SENSATION IN THROAT: Primary | ICD-10-CM

## 2021-08-24 PROCEDURE — 70360 X-RAY EXAM OF NECK: CPT

## 2021-08-24 PROCEDURE — 99283 EMERGENCY DEPT VISIT LOW MDM: CPT

## 2021-08-24 ASSESSMENT — PAIN SCALES - GENERAL
PAINLEVEL_OUTOF10: 0
PAINLEVEL_OUTOF10: 0

## 2021-08-24 NOTE — ED TRIAGE NOTES
pt. feels like there is a mucus ball in throat for a month, also feels like \"snot\" dripping into throat, no pain. Tonsils enlarged.

## 2021-08-24 NOTE — ED PROVIDER NOTES
[address] [facility] Emergency Department      Pt Name: Dash Waller  MRN: [id number]  [place] 1995  Date of evaluation: 8/24/2021  Provider: Deysi Jolly MD  CHIEF COMPLAINT  Chief Complaint   Patient presents with    Other     pt. feels like there is a mucus ball in throat for a month, also feels like \"snot\" dripping into throat, no pain     HPI  Dash Waller is a 31 y.o. female who presents because of discomfort in her throat. This felt like there is a ball of mucus in the back of her throat for the past month. She says that she has a bad taste in her mouth. She denies any episodes of choking. She denies any fever or chills. Denies any nasal congestion or pressure in her sinuses. She does not have a cough. She has been able to take liquids. She has been eating normally and this sensation does not disturb her ability to swallow. Denies any nausea or vomiting. Has never had this problem before this past month. Denies any breathing difficulty. Had an appointment with one of her regular doctors about a week ago but did not mention this problem at that visit. REVIEW OF SYSTEMS:  Normal appetite, no fever, no pain, no chest pain, no abdominal pain, on birth control, recent neg preg test at ob office Pertinent positives and negatives as per the HPI. All other review of systems reviewed and negative. Nursing notes reviewed. PAST MEDICAL HISTORY  Past Medical History:   Diagnosis Date    ADHD (attention deficit hyperactivity disorder)     Allergies     Anxiety     Asthma     Depression      SURGICAL HISTORY  Past Surgical History:   Procedure Laterality Date    ADENOIDECTOMY      TYMPANOSTOMY TUBE PLACEMENT       MEDICATIONS:  No current facility-administered medications on file prior to encounter.      Current Outpatient Medications on File Prior to Encounter   Medication Sig Dispense Refill    levonorgestrel (MIRENA) IUD 52 mg 1 each by Intrauterine route      melatonin 3 MG TABS tablet Take 3 mg by mouth       ALLERGIES  Patient has no known allergies. SOCIAL HISTORY:  Social History     Tobacco Use    Smoking status: Current Every Day Smoker     Packs/day: 0.50    Smokeless tobacco: Never Used   Vaping Use    Vaping Use: Never used   Substance Use Topics    Alcohol use: Not Currently     Comment: once a month    Drug use: Yes     Types: Marijuana     Comment: uses daily as of 8//24/21     IMMUNIZATIONS:  Noncontributory    PHYSICAL EXAM  VITAL SIGNS:  Blood pressure (!) 96/35, pulse 87, temperature 98.2 °F (36.8 °C), temperature source Oral, resp. rate 16, height 5' 1.5\" (1.562 m), weight 104 lb 15 oz (47.6 kg), SpO2 98 %. Constitutional:  31 y.o. female nontoxic, tolerates secretions normally  HENT:  Atraumatic, mucous membranes moist, no trismus, uvula midline, throat appears normal, no FB visualized, voice is normal  Eyes:   Conjunctiva ear, no icterus  Neck:  Supple, trachea midline, no stridor, no adenopathy  Thorax & Lungs:  Respiratory effort normal, no murmur  Abdomen:  Non distended, soft, NT  Back:  No deformity  Extremities:  No cyanosis, no edema  Skin:  Warm, dry, no facial or extremity rash appreciated  Neurologic:  Alert, normal coordination, normal voice and speech    DIAGNOSTIC RESULTS:    RADIOLOGY:     Plain x-rays were viewed by me:   XR NECK SOFT TISSUE   Final Result   Epiglottis not completely defined with soft tissue density projecting in the   region of the vallecula possibly representing ingested material or a mass,   direct visualization may be helpful           ED COURSE:  Uneventful    PROCEDURES:  None    CONSULTATIONS:  None    MEDICAL DECISION MAKING: Felecia Escobedo is a 31 y.o. female who presented with mucous FB sensation in throat for the past month. The patient is adequately hydrated, clinically nontoxic, and does not have any findings that would suggest impending airway issues.   I considered doing additional CT imaging of the area, but the patient prefers to follow-up with her ENT specialist with whom she is already established. I feel this is a reasonable plan as there is no toxicity and I do not have any imminent airway concerns. I did advise her to come back to the hospital should she start feeling like she is having breathing difficulty if it occurs prior to her follow-up visit with her ear nose and throat doctor. Simba Whelan was given appropriate discharge instructions. Referral to follow up provider. Differential diagnosis:  Peritonsillar abscess, epiglottitis, retropharyngeal abscess, foreign body, Jimy's angina, dehydration, infectious mono, Strep, other    FOLLOW UP:    your ENT specialist    Schedule an appointment as soon as possible for a visit       Jacob Eisenberg MD  [address] [address]  [phone]      if your doctor is not available right away    [facility]  [address] [facility]  Go to   If symptoms worsen    FINAL IMPRESSION:    1. Foreign body sensation in throat    2. Abnormal x-ray        (Please note that I used voice recognition software to generate this note.   Occasionally words are mistranscribed despite my efforts to edit errors.)        Lydia Olivera MD  08/24/21 6244

## 2022-12-15 ENCOUNTER — HOSPITAL ENCOUNTER (EMERGENCY)
Age: 27
Discharge: HOME | End: 2022-12-15
Attending: EMERGENCY MEDICINE
Payer: MEDICAID

## 2022-12-15 VITALS
SYSTOLIC BLOOD PRESSURE: 104 MMHG | DIASTOLIC BLOOD PRESSURE: 70 MMHG | OXYGEN SATURATION: 98 % | TEMPERATURE: 98.6 F | HEIGHT: 61 IN | WEIGHT: 115 LBS | RESPIRATION RATE: 16 BRPM | HEART RATE: 94 BPM | BODY MASS INDEX: 21.71 KG/M2

## 2022-12-15 DIAGNOSIS — U07.1 COVID-19: Primary | ICD-10-CM

## 2022-12-15 LAB
RAPID INFLUENZA  B AGN: NEGATIVE
RAPID INFLUENZA A AGN: NEGATIVE
S PYO AG THROAT QL: NEGATIVE
SARS-COV-2, NAAT: DETECTED

## 2022-12-15 PROCEDURE — 87081 CULTURE SCREEN ONLY: CPT

## 2022-12-15 PROCEDURE — 87077 CULTURE AEROBIC IDENTIFY: CPT

## 2022-12-15 PROCEDURE — 99283 EMERGENCY DEPT VISIT LOW MDM: CPT

## 2022-12-15 PROCEDURE — 87804 INFLUENZA ASSAY W/OPTIC: CPT

## 2022-12-15 PROCEDURE — 87635 SARS-COV-2 COVID-19 AMP PRB: CPT

## 2022-12-15 PROCEDURE — 87880 STREP A ASSAY W/OPTIC: CPT

## 2022-12-15 RX ORDER — GLYCERIN 0.25 %
1 DROPS OPHTHALMIC (EYE) EVERY 4 HOURS PRN
Qty: 118 ML | Refills: 0 | Status: SHIPPED | OUTPATIENT
Start: 2022-12-15

## 2022-12-15 RX ORDER — FLUTICASONE PROPIONATE 50 MCG
SPRAY, SUSPENSION (ML) NASAL
COMMUNITY
Start: 2022-10-11

## 2022-12-15 RX ORDER — QUETIAPINE FUMARATE 25 MG/1
TABLET, FILM COATED ORAL
COMMUNITY
Start: 2022-11-22

## 2022-12-15 RX ORDER — LORATADINE 10 MG/1
TABLET ORAL
COMMUNITY
Start: 2022-10-11

## 2022-12-15 ASSESSMENT — PAIN - FUNCTIONAL ASSESSMENT
PAIN_FUNCTIONAL_ASSESSMENT: 0-10
PAIN_FUNCTIONAL_ASSESSMENT: 0-10

## 2022-12-15 ASSESSMENT — ENCOUNTER SYMPTOMS
VOICE CHANGE: 0
NAUSEA: 0
SHORTNESS OF BREATH: 0
VOMITING: 0
COUGH: 1
DIARRHEA: 0
TROUBLE SWALLOWING: 0
SORE THROAT: 1

## 2022-12-15 ASSESSMENT — PAIN DESCRIPTION - LOCATION: LOCATION: THROAT

## 2022-12-15 ASSESSMENT — LIFESTYLE VARIABLES
HOW OFTEN DO YOU HAVE A DRINK CONTAINING ALCOHOL: NEVER
HOW MANY STANDARD DRINKS CONTAINING ALCOHOL DO YOU HAVE ON A TYPICAL DAY: PATIENT DOES NOT DRINK

## 2022-12-15 ASSESSMENT — PAIN SCALES - GENERAL: PAINLEVEL_OUTOF10: 6

## 2022-12-15 NOTE — ED PROVIDER NOTES
[facility]  eMERGENCY dEPARTMENT eNCOUnter      Pt Name: Betito Vergara  MRN: [id number]  [place] 1995  Date of evaluation: 12/15/2022  Provider: Omayra Michael MD    CHIEF COMPLAINT       Chief Complaint   Patient presents with    Pharyngitis     States she woke up today with sore throat. C/o pain on right side of neck and has swollen lymph nodei n the area of pain. HISTORY OF PRESENT ILLNESS   (Location/Symptom, Timing/Onset, Context/Setting, Quality, Duration, Modifying Factors, Severity)  Note limiting factors. History obtained from: the patient    Betito Vergara is a 28 y.o. female who reports 1 day of sore throat, mild nonproductive cough, and swollen lymph nodes. Patient reports her symptoms are moderate constant and worsening. She denies any fever, shortness of breath, or inability to swallow or speak. She reports he took Tylenol prior to coming emergency room with mild improvement in symptoms. HPI    Nursing Notes were reviewed. REVIEW OFSYSTEMS    (2-9 systems for level 4, 10 or more for level 5)     Review of Systems   Constitutional:  Negative for appetite change and fever. HENT:  Positive for sore throat. Negative for trouble swallowing and voice change. Eyes:  Negative for visual disturbance. Respiratory:  Positive for cough. Negative for shortness of breath. Cardiovascular:  Negative for chest pain and palpitations. Gastrointestinal:  Negative for diarrhea, nausea and vomiting. Genitourinary:  Negative for dysuria. Musculoskeletal:  Negative for gait problem. Neurological:  Negative for seizures and syncope. Psychiatric/Behavioral:  Negative for self-injury and suicidal ideas. Except as noted above the remainder of the review of systems was reviewed and negative.        PAST MEDICAL HISTORY     Past Medical History:   Diagnosis Date    ADHD (attention deficit hyperactivity disorder)     Allergies     Anxiety Asthma     Depression          SURGICAL HISTORY       Past Surgical History:   Procedure Laterality Date    ADENOIDECTOMY      TYMPANOSTOMY TUBE PLACEMENT           CURRENT MEDICATIONS       Previous Medications    FLUTICASONE (FLONASE) 50 MCG/ACT NASAL SPRAY        LEVONORGESTREL (MIRENA) IUD 52 MG    1 each by Intrauterine route    LORATADINE (CLARITIN) 10 MG TABLET        MELATONIN 3 MG TABS TABLET    Take 3 mg by mouth    QUETIAPINE (SEROQUEL) 25 MG TABLET           ALLERGIES     Patient has no known allergies. FAMILY HISTORY     History reviewed. No pertinent family history. SOCIAL HISTORY       Social History     Socioeconomic History    Marital status: Single     Spouse name: None    Number of children: None    Years of education: None    Highest education level: None   Tobacco Use    Smoking status: Every Day     Packs/day: 0.50     Types: Cigarettes    Smokeless tobacco: Never   Vaping Use    Vaping Use: Never used   Substance and Sexual Activity    Alcohol use: Not Currently     Comment: once a month    Drug use: Yes     Types: Marijuana (Juan Colla)     Comment: uses daily as of 8//24/21    Sexual activity: Yes     Partners: Male   Social History Narrative    ** Merged History Encounter **              PHYSICAL EXAM    (up to 7 for level 4, 8 or more for level 5)     ED Triage Vitals [12/15/22 1728]   BP Temp Temp Source Heart Rate Resp SpO2 Height Weight   104/70 98.6 °F (37 °C) Oral (!) 105 16 97 % 5' 1\" (1.549 m) 115 lb (52.2 kg)       Physical Exam  Constitutional:       General: She is not in acute distress. Appearance: She is well-developed. HENT:      Head: Normocephalic and atraumatic. Mouth/Throat:      Mouth: No oral lesions. Pharynx: Posterior oropharyngeal erythema present. No oropharyngeal exudate. Eyes:      Conjunctiva/sclera: Conjunctivae normal.   Neck:      Vascular: No JVD.    Cardiovascular:      Comments: Regular rhythm with rate of 100  Pulmonary:      Effort: Pulmonary effort is normal. No respiratory distress. Breath sounds: No stridor. Comments: No work of breathing. No stridor. Abdominal:      Tenderness: There is no abdominal tenderness. There is no rebound. Musculoskeletal:         General: No deformity. Neurological:      Mental Status: She is alert. DIAGNOSTIC RESULTS       LABS:  Labs Reviewed   COVID-19, RAPID - Abnormal; Notable for the following components:       Result Value    SARS-CoV-2, NAAT DETECTED (*)     All other components within normal limits   RAPID INFLUENZA A/B ANTIGENS   STREP SCREEN GROUP A THROAT   CULTURE, BETA STREP CONFIRM PLATES       All otherlabs were within normal range or not returned as of this dictation. EMERGENCY DEPARTMENT COURSE and DIFFERENTIAL DIAGNOSIS/MDM:   Vitals:    Vitals:    12/15/22 1728   BP: 104/70   Pulse: (!) 105   Resp: 16   Temp: 98.6 °F (37 °C)   TempSrc: Oral   SpO2: 97%   Weight: 115 lb (52.2 kg)   Height: 5' 1\" (1.549 m)         CC/HPI Summary, DDx, ED Course, Reassessment, Disposition Considerations (include Tests not done, Admit vs D/C, Shared Decision Making, Pt Expectation of Test or Tx.):  Patient was bradycardic on arrival but this normalizes upon being roomed. Rapid strep test rapid influenza test are negative but rapid COVID test is positive. Patient does have asthma and qualifies for packs of that. I discussed the risk, benefits, alternatives of her and she does want to be treated with Paxil did. I have discussed her current medications including Seroquel and how this might increase the concentration of Seroquel in her bloodstream.  The patient will cut her Seroquel pills in half while she is on Paxil then. Close primary care follow-up is recommended and strict ER return precautions are given for new or worsening symptoms. Work note provided for patient.   Patient is also prescribed Chloraseptic spray per her request.        FINAL IMPRESSION      1. COVID-19 DISPOSITION/PLAN     DISPOSITION Decision To Discharge 12/15/2022 07:04:47 PM      PATIENT REFERRED TO:  [facility]    In 1 week      [address] [address]  [phone]    If symptoms worsen    DISCHARGE MEDICATIONS:  New Prescriptions    NIRMATRELVIR/RITONAVIR (PAXLOVID) 20 X 150 MG & 10 X 100MG TBPK    Take 3 tablets (two 150 mg nirmatrelvir and one 100 mg ritonavir tablets) by mouth every 12 hours for 5 days. PHENOL-GLYCERIN (CHLORASEPTIC MAX SORE THROAT) 1.5-33 % LIQD    Take 1 spray by mouth every 4 hours as needed (sore throat)              (Please note that portions of this note were completed with a voice recognition program.  Efforts were made to edit the dictations but occasionally words are mis-transcribed. )    Dennise Cabral MD (electronically signed)  Attending Emergency Physician           Dennise Cabral MD  12/15/22 1622

## 2022-12-15 NOTE — Clinical Note
Ramses Coronado was seen and treated in our emergency department on 12/15/2022. She may return to work on 12/19/2022. If you have any questions or concerns, please don't hesitate to call.       Isis Montgomery MD

## 2022-12-17 LAB
ORGANISM: ABNORMAL
S PYO THROAT QL CULT: ABNORMAL
S PYO THROAT QL CULT: ABNORMAL

## 2022-12-18 NOTE — RESULT ENCOUNTER NOTE
Patient's positive result has been appropriately evaluated by the provider pool. Patient was contacted and notified of the change in treatment plan.     Medication was phoned to the patient's pharmacy per protocol:    Pharmacy:   [facility] [id number] [facility], [address] [place] [address] [phone] Cata Grover [phone] [address] [address]  Phone: [phone] Fax: [phone]    Spoke with her on the phone  She started to take keflex that was at home and is feeling better  aware to start amoxicillin  to follow up with PMD  Rx of amoxicillin called to [facility]

## 2023-11-01 ENCOUNTER — HOSPITAL ENCOUNTER (EMERGENCY)
Age: 28
Discharge: HOME | End: 2023-11-01
Attending: EMERGENCY MEDICINE
Payer: MEDICAID

## 2023-11-01 VITALS
OXYGEN SATURATION: 97 % | WEIGHT: 122.8 LBS | HEART RATE: 94 BPM | RESPIRATION RATE: 18 BRPM | SYSTOLIC BLOOD PRESSURE: 116 MMHG | DIASTOLIC BLOOD PRESSURE: 64 MMHG | HEIGHT: 61 IN | BODY MASS INDEX: 23.18 KG/M2 | TEMPERATURE: 98.5 F

## 2023-11-01 DIAGNOSIS — L30.9 DERMATITIS: ICD-10-CM

## 2023-11-01 DIAGNOSIS — H00.11 CHALAZION OF RIGHT UPPER EYELID: Primary | ICD-10-CM

## 2023-11-01 PROCEDURE — 99283 EMERGENCY DEPT VISIT LOW MDM: CPT

## 2023-11-01 RX ORDER — DIAPER,BRIEF,INFANT-TODD,DISP
EACH MISCELLANEOUS
Qty: 15 G | Refills: 1 | Status: SHIPPED | OUTPATIENT
Start: 2023-11-01 | End: 2023-11-08

## 2023-11-01 RX ORDER — ERYTHROMYCIN 5 MG/G
OINTMENT OPHTHALMIC
Qty: 3.5 G | Refills: 0 | Status: SHIPPED | OUTPATIENT
Start: 2023-11-01

## 2023-11-01 ASSESSMENT — PAIN - FUNCTIONAL ASSESSMENT: PAIN_FUNCTIONAL_ASSESSMENT: 0-10

## 2023-11-01 ASSESSMENT — PATIENT HEALTH QUESTIONNAIRE - PHQ9
SUM OF ALL RESPONSES TO PHQ QUESTIONS 1-9: 0
SUM OF ALL RESPONSES TO PHQ QUESTIONS 1-9: 0
SUM OF ALL RESPONSES TO PHQ9 QUESTIONS 1 & 2: 0
SUM OF ALL RESPONSES TO PHQ QUESTIONS 1-9: 0
SUM OF ALL RESPONSES TO PHQ QUESTIONS 1-9: 0
2. FEELING DOWN, DEPRESSED OR HOPELESS: 0
1. LITTLE INTEREST OR PLEASURE IN DOING THINGS: 0

## 2023-11-01 ASSESSMENT — VISUAL ACUITY
OS: 20/15
OD: 20/20
OU: 20/15

## 2023-11-01 ASSESSMENT — PAIN SCALES - GENERAL: PAINLEVEL_OUTOF10: 0

## 2023-11-01 NOTE — ED NOTES
Discharge instructions reviewed. Patient verbalized understanding. Prescription x1 sent to pharmacy.        Panda Harris RN  11/01/23 0634

## 2023-11-01 NOTE — ED TRIAGE NOTES
Patient came to ER with complaints of rash in the inside of corner of right eye. Patient stated feels like there is a pimple on inner eye lid of right eye. Patient stated she put on fake eye lashes 10/22 and took off 10/24. Patient stated used new glue.

## 2023-11-01 NOTE — DISCHARGE INSTRUCTIONS
Use warm compresses several times daily to your right eye. Use antibiotic ointment as prescribed for 5 to 7 days. Use hydrocortisone cream on rash as prescribed. Follow-up in 1 week if not improved. Referral provided. verbal instruction

## 2024-01-11 ENCOUNTER — HOSPITAL ENCOUNTER (EMERGENCY)
Age: 29
Discharge: HOME | End: 2024-01-11
Attending: EMERGENCY MEDICINE
Payer: MEDICAID

## 2024-01-11 VITALS
RESPIRATION RATE: 18 BRPM | WEIGHT: 126.1 LBS | DIASTOLIC BLOOD PRESSURE: 78 MMHG | HEART RATE: 90 BPM | SYSTOLIC BLOOD PRESSURE: 121 MMHG | BODY MASS INDEX: 23.81 KG/M2 | OXYGEN SATURATION: 97 % | TEMPERATURE: 97.5 F | HEIGHT: 61 IN

## 2024-01-11 DIAGNOSIS — Z86.69 HISTORY OF HEARING LOSS: ICD-10-CM

## 2024-01-11 DIAGNOSIS — Z72.89 CURRENT EVERY DAY VAPING: ICD-10-CM

## 2024-01-11 DIAGNOSIS — Z86.69 HISTORY OF RECURRENT EAR INFECTION: ICD-10-CM

## 2024-01-11 DIAGNOSIS — R05.1 ACUTE COUGH: Primary | ICD-10-CM

## 2024-01-11 PROCEDURE — 99282 EMERGENCY DEPT VISIT SF MDM: CPT

## 2024-01-11 RX ORDER — HYDROXYZINE HYDROCHLORIDE 25 MG/1
1 TABLET, FILM COATED ORAL 3 TIMES DAILY PRN
COMMUNITY
Start: 2023-11-02

## 2024-01-11 ASSESSMENT — PAIN - FUNCTIONAL ASSESSMENT: PAIN_FUNCTIONAL_ASSESSMENT: NONE - DENIES PAIN

## 2024-01-11 ASSESSMENT — LIFESTYLE VARIABLES
HOW MANY STANDARD DRINKS CONTAINING ALCOHOL DO YOU HAVE ON A TYPICAL DAY: 1 OR 2
HOW OFTEN DO YOU HAVE A DRINK CONTAINING ALCOHOL: MONTHLY OR LESS

## 2024-01-11 NOTE — DISCHARGE INSTRUCTIONS
Since your cough is worse in the morning and improves as the day goes on there is high likelihood it is related to low humidity in the house. When the weather turns cold the humidity drops. Ideally in the home it should be around 40-45%. You can get a thermometer that tells you the humidity from [employer], [employer], [employer] etc. remember it is very important to make sure if you buy humidifier to keep it clean.    In the meantime, your next best thing to work on would be quitting vaping, you can do it!  It is the best gift you can give your health overall.    We have given you another referral to the ENT clinic. Following up with them is very important for your future health.     Follow up with primary care as needed.     Return to ED for any new or worsening symptoms or concerns.

## 2024-01-11 NOTE — ED PROVIDER NOTES
[facility]  EMERGENCY DEPARTMENT ENCOUNTER        Pt Name: Aristides Carson  MRN: [id number]  Birthdate 1995  Date of evaluation: 1/11/2024  Provider: Cathryn Trinh MD  PCP: [facility]  Note Started: 4:37 PM EST 1/11/24    CHIEF COMPLAINT      I was bringing him so I thought I'd get checked too  HISTORY OF PRESENT ILLNESS: 1 or more Elements     Chief Complaint   Patient presents with    Cough     Pt states she has had a cough x 1 month       History from : Patient  Limitations to history : None    Aristides Carson is a 28 y.o. female who presents to the emergency department secondary to concern for cough. Has been ongoing for about a month. History of cigarette smoking but quit that but now smoking vape. Does state she knows it is not better than smoking cigarettes. Reports the cough and feeling of dry throat is worst in the morning when she wakes up and improves as the day goes on. Does not have a humidifier at home, she does state that she will boil water to help improve humidity.  No fevers chills nausea vomiting.  No chest pain.  Cough is nonproductive.  She reports she has a history of chronic ear issues and had some drainage recently from the left ear and has been given a referral to ENT but has not yet followed up.  She states it is hard for her to get there because she does not drive.  She states she has been told before she should get hearing aids.    Past medical history noted below. Aside from what is stated above denies any other symptoms or modifying factors.    Nursing Notes were all reviewed and agreed with or any disagreements addressed in HPI/MDM.  REVIEW OF SYSTEMS :    Review of Systems Pertinent positive and negative findings as documented in the HPI  PAST MEDICAL HISTORY      Past Medical History:   Diagnosis Date    ADHD (attention deficit hyperactivity disorder)     Allergies     Anxiety     Asthma     Depression        SURGICALHISTORY

## 2024-01-30 ENCOUNTER — HOSPITAL ENCOUNTER (EMERGENCY)
Age: 29
Discharge: HOME | End: 2024-01-30
Attending: EMERGENCY MEDICINE
Payer: MEDICAID

## 2024-01-30 VITALS
RESPIRATION RATE: 16 BRPM | HEART RATE: 90 BPM | DIASTOLIC BLOOD PRESSURE: 82 MMHG | TEMPERATURE: 99 F | HEIGHT: 61 IN | BODY MASS INDEX: 24.02 KG/M2 | OXYGEN SATURATION: 97 % | WEIGHT: 127.21 LBS | SYSTOLIC BLOOD PRESSURE: 122 MMHG

## 2024-01-30 DIAGNOSIS — J02.0 STREP PHARYNGITIS: Primary | ICD-10-CM

## 2024-01-30 LAB — S PYO AG THROAT QL: NEGATIVE

## 2024-01-30 PROCEDURE — 87077 CULTURE AEROBIC IDENTIFY: CPT

## 2024-01-30 PROCEDURE — 87081 CULTURE SCREEN ONLY: CPT

## 2024-01-30 PROCEDURE — 87880 STREP A ASSAY W/OPTIC: CPT

## 2024-01-30 PROCEDURE — 6370000000 HC RX 637 (ALT 250 FOR IP): Performed by: EMERGENCY MEDICINE

## 2024-01-30 PROCEDURE — 99283 EMERGENCY DEPT VISIT LOW MDM: CPT

## 2024-01-30 RX ORDER — NAPROXEN 500 MG/1
500 TABLET ORAL 2 TIMES DAILY WITH MEALS
Qty: 20 TABLET | Refills: 0 | Status: SHIPPED | OUTPATIENT
Start: 2024-01-30 | End: 2024-02-09

## 2024-01-30 RX ORDER — AMOXICILLIN 500 MG/1
500 CAPSULE ORAL 2 TIMES DAILY
Qty: 20 CAPSULE | Refills: 0 | Status: SHIPPED | OUTPATIENT
Start: 2024-01-30 | End: 2024-02-09

## 2024-01-30 RX ORDER — IBUPROFEN 400 MG/1
400 TABLET ORAL ONCE
Status: COMPLETED | OUTPATIENT
Start: 2024-01-30 | End: 2024-01-30

## 2024-01-30 RX ADMIN — IBUPROFEN 400 MG: 400 TABLET, FILM COATED ORAL at 18:55

## 2024-01-30 ASSESSMENT — PAIN DESCRIPTION - ORIENTATION: ORIENTATION: RIGHT;LEFT

## 2024-01-30 ASSESSMENT — PAIN SCALES - GENERAL
PAINLEVEL_OUTOF10: 5
PAINLEVEL_OUTOF10: 5

## 2024-01-30 ASSESSMENT — PAIN DESCRIPTION - DESCRIPTORS: DESCRIPTORS: ACHING

## 2024-01-30 ASSESSMENT — PAIN DESCRIPTION - LOCATION: LOCATION: THROAT

## 2024-01-30 ASSESSMENT — PAIN - FUNCTIONAL ASSESSMENT
PAIN_FUNCTIONAL_ASSESSMENT: 0-10
PAIN_FUNCTIONAL_ASSESSMENT: 0-10

## 2024-01-31 NOTE — ED PROVIDER NOTES
EMERGENCY DEPARTMENT PROVIDER NOTE    Patient Identification  Pt Name: Aristides Carson  MRN: [id number]  Birthdate 1995  Date of evaluation: 1/30/2024  Provider: Rancho Hall DO  PCP: [facility]    Chief Complaint  Pharyngitis (Pt. C/o sore throat onset yesterday, tonsils swollen, took Tylenol at 1500)      HPI  (History provided by patient)  This is a 28 y.o. female who was brought in by self for sore throat ongoing since yesterday.  Associated with subjective fever and chills.  Patient reports history of similar symptoms over the past several years which have been due to strep throat and resolved after antibiotics.  Most recent treatment about 9 months ago.  She denies any voice changes, shortness of breath, chest pain or cough..       I have reviewed the following nursing documentation:  Allergies: Patient has no known allergies.    Past medical history:   Past Medical History:   Diagnosis Date    ADHD (attention deficit hyperactivity disorder)     Allergies     Anxiety     Asthma     Depression      Past surgical history:   Past Surgical History:   Procedure Laterality Date    ADENOIDECTOMY      TYMPANOSTOMY TUBE PLACEMENT         Home medications:   Discharge Medication List as of 1/30/2024  6:58 PM        CONTINUE these medications which have NOT CHANGED    Details   hydrOXYzine HCl (ATARAX) 25 MG tablet Take 1 tablet by mouth 3 times daily as neededHistorical Med      QUEtiapine (SEROQUEL) 25 MG tablet Historical Med      levonorgestrel (MIRENA) IUD 52 mg 1 each by IntraUTERine route, IntraUTERine, Starting Fri 7/27/2018, Until Tue 1/30/2024 at 2359, Historical Med      melatonin 3 MG TABS tablet Take 1 tablet by mouthHistorical Med             Social history:  reports that she has quit smoking. Her smoking use included e-cigarettes. She has never used smokeless tobacco. She reports current alcohol use. She reports that she does not currently use drugs after having used the following

## 2024-02-02 LAB
ORGANISM: ABNORMAL
S PYO THROAT QL CULT: ABNORMAL
S PYO THROAT QL CULT: ABNORMAL

## 2024-06-08 ENCOUNTER — HOSPITAL ENCOUNTER (EMERGENCY)
Age: 29
Discharge: HOME | End: 2024-06-08
Attending: EMERGENCY MEDICINE
Payer: MEDICAID

## 2024-06-08 ENCOUNTER — APPOINTMENT (OUTPATIENT)
Dept: GENERAL RADIOLOGY | Age: 29
End: 2024-06-08
Payer: MEDICAID

## 2024-06-08 VITALS
OXYGEN SATURATION: 99 % | BODY MASS INDEX: 27.8 KG/M2 | TEMPERATURE: 98.3 F | DIASTOLIC BLOOD PRESSURE: 68 MMHG | HEIGHT: 61 IN | SYSTOLIC BLOOD PRESSURE: 107 MMHG | WEIGHT: 147.27 LBS | HEART RATE: 57 BPM | RESPIRATION RATE: 14 BRPM

## 2024-06-08 DIAGNOSIS — R06.00 DYSPNEA, UNSPECIFIED TYPE: Primary | ICD-10-CM

## 2024-06-08 DIAGNOSIS — E86.0 DEHYDRATION AFTER EXERTION: ICD-10-CM

## 2024-06-08 LAB
ANION GAP SERPL CALCULATED.3IONS-SCNC: 12 MMOL/L (ref 3–16)
BASOPHILS # BLD: 0 K/UL (ref 0–0.2)
BASOPHILS NFR BLD: 0.2 %
BUN SERPL-MCNC: 9 MG/DL (ref 7–20)
CALCIUM SERPL-MCNC: 8.8 MG/DL (ref 8.3–10.6)
CHLORIDE SERPL-SCNC: 105 MMOL/L (ref 99–110)
CO2 SERPL-SCNC: 22 MMOL/L (ref 21–32)
CREAT SERPL-MCNC: 0.5 MG/DL (ref 0.6–1.1)
DEPRECATED RDW RBC AUTO: 12.3 % (ref 12.4–15.4)
EOSINOPHIL # BLD: 0.3 K/UL (ref 0–0.6)
EOSINOPHIL NFR BLD: 3.4 %
GFR SERPLBLD CREATININE-BSD FMLA CKD-EPI: >90 ML/MIN/{1.73_M2}
GLUCOSE SERPL-MCNC: 100 MG/DL (ref 70–99)
HCG SERPL QL: NEGATIVE
HCT VFR BLD AUTO: 40.2 % (ref 36–48)
HGB BLD-MCNC: 13.2 G/DL (ref 12–16)
IMM GRANULOCYTES # BLD: 0 K/UL (ref 0–0.2)
IMM GRANULOCYTES NFR BLD: 0.2 %
LYMPHOCYTES # BLD: 1.7 K/UL (ref 1–5.1)
LYMPHOCYTES NFR BLD: 17.5 %
MCH RBC QN AUTO: 30.2 PG (ref 26–34)
MCHC RBC AUTO-ENTMCNC: 32.8 G/DL (ref 32–36.4)
MCV RBC AUTO: 92 FL (ref 80–100)
MONOCYTES # BLD: 0.6 K/UL (ref 0–1.3)
MONOCYTES NFR BLD: 6.1 %
NEUTROPHILS # BLD: 6.9 K/UL (ref 1.7–7.7)
NEUTROPHILS NFR BLD: 72.6 %
PLATELET # BLD AUTO: 292 K/UL (ref 135–450)
PMV BLD AUTO: 10.5 FL (ref 5–10.5)
POTASSIUM SERPL-SCNC: 3.9 MMOL/L (ref 3.5–5.1)
RBC # BLD AUTO: 4.37 M/UL (ref 4–5.2)
SODIUM SERPL-SCNC: 139 MMOL/L (ref 136–145)
TROPONIN, HIGH SENSITIVITY: <6 NG/L (ref 0–14)
WBC # BLD AUTO: 9.5 K/UL (ref 4–11)

## 2024-06-08 PROCEDURE — 99285 EMERGENCY DEPT VISIT HI MDM: CPT

## 2024-06-08 PROCEDURE — 71046 X-RAY EXAM CHEST 2 VIEWS: CPT

## 2024-06-08 PROCEDURE — 96360 HYDRATION IV INFUSION INIT: CPT

## 2024-06-08 PROCEDURE — 85025 COMPLETE CBC W/AUTO DIFF WBC: CPT

## 2024-06-08 PROCEDURE — 84703 CHORIONIC GONADOTROPIN ASSAY: CPT

## 2024-06-08 PROCEDURE — 2580000003 HC RX 258: Performed by: EMERGENCY MEDICINE

## 2024-06-08 PROCEDURE — 93005 ELECTROCARDIOGRAM TRACING: CPT | Performed by: EMERGENCY MEDICINE

## 2024-06-08 PROCEDURE — 36415 COLL VENOUS BLD VENIPUNCTURE: CPT

## 2024-06-08 PROCEDURE — 84484 ASSAY OF TROPONIN QUANT: CPT

## 2024-06-08 PROCEDURE — 80048 BASIC METABOLIC PNL TOTAL CA: CPT

## 2024-06-08 RX ORDER — 0.9 % SODIUM CHLORIDE 0.9 %
500 INTRAVENOUS SOLUTION INTRAVENOUS ONCE
Status: COMPLETED | OUTPATIENT
Start: 2024-06-08 | End: 2024-06-08

## 2024-06-08 RX ORDER — 0.9 % SODIUM CHLORIDE 0.9 %
500 INTRAVENOUS SOLUTION INTRAVENOUS ONCE
Status: DISCONTINUED | OUTPATIENT
Start: 2024-06-08 | End: 2024-06-08

## 2024-06-08 RX ADMIN — SODIUM CHLORIDE 500 ML: 9 INJECTION, SOLUTION INTRAVENOUS at 10:52

## 2024-06-08 ASSESSMENT — PAIN - FUNCTIONAL ASSESSMENT: PAIN_FUNCTIONAL_ASSESSMENT: NONE - DENIES PAIN

## 2024-06-08 ASSESSMENT — PAIN SCALES - GENERAL
PAINLEVEL_OUTOF10: 0
PAINLEVEL_OUTOF10: 0

## 2024-06-08 ASSESSMENT — HEART SCORE: ECG: NORMAL

## 2024-06-08 NOTE — ED TRIAGE NOTES
Patient presents to ED complaining of shortness of breath with lightheadedness which started yesterday. 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 Reports hx of low blood pressure and states she sometimes gets dizziness when standing up, states this feels different. Denies cardic hx, denies chest pain.    Patient resting on bed, respirations even and easy at this time. No obvious distress.

## 2024-06-08 NOTE — DISCHARGE INSTRUCTIONS
1.  Drink plenty of fluids and avoid hot environment.  2.  Follow with your primary care physician in the next couple days call for an appointment.  3.  Return emergency department for severe chest pain shortness of breath or signs of infection.

## 2024-06-08 NOTE — ED PROVIDER NOTES
EMERGENCY DEPARTMENT ENCOUNTER     [facility]     Pt Name: Aristides Carson   MRN: [id number]   Birthdate 1995   Date of evaluation: 6/8/2024   Provider: Gerardo Reyes MD   PCP: [facility]   Note Started: 10:27 AM EDT 6/8/24     CHIEF COMPLAINT     Chief Complaint   Patient presents with    Shortness of Breath     Patient presents to ED complaining of shortness of breath with lightheadedness which started yesterday. 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 Reports hx of low blood pressure and states she sometimes gets dizziness when standing up, states this feels different. Denies cardic hx, denies chest pain.        HISTORY OF PRESENT ILLNESS:  History from : Patient   Limitations to history : None     Aristides Carson is a 29 y.o. female who presents with multiple complaints predominantly dizziness lightheadedness yesterday.  Is a 29-year-old  female with history of asthma otherwise noncontributory past medical history who is air conditioning is out at home.  Yesterday she was moving some close when she got dizzy lightheaded and felt like she was about to pass out.  Denies any chest pain but later developed some shortness of breath.  Here she complains of intermittent dizziness similar to this no other focal neurologic complaints and she denies any chest pain nausea diaphoresis or shortness of breath at this time.  For cardiogenic risk factors she is a smoker otherwise no cardiogenic risk factors.  He denies any chest pain.    Nursing Notes were all reviewed and agreed with or any disagreements were addressed in the HPI.     ROS: Positives and Pertinent negatives as per HPI.    PAST MEDICAL HISTORY     Past medical history:  has a past medical history of ADHD (attention deficit hyperactivity disorder), Allergies, Anxiety, Asthma, and Depression.    Past surgical  EKG Interpretation    Interpreted by emergency department physician    Rhythm: normal sinus   Rate: normal  Axis: normal  Ectopy: none  Conduction: normal  ST Segments: normal  T Waves: normal  Q Waves: none    Clinical Impression: no acute changes, normal EKG.    Gerardo Reyes MD      RADIOLOGY:    Non-plain film images such as CT, Ultrasound and MRI are read by the radiologist. Plain radiographic images are visualized and preliminarily interpreted by the ED Provider with the below findings:      Interpretation per the Radiologist below, if available at the time of this note:    XR CHEST (2 VW)   Final Result   No acute cardiopulmonary process.                  EMERGENCY DEPARTMENT COURSE and DIFFERENTIAL DIAGNOSIS/MDM:     Vitals:    Vitals:    06/08/24 1100 06/08/24 1115 06/08/24 1130 06/08/24 1145   BP: 110/73 106/66 109/68    Pulse: 67 57 64 59   Resp: 14 10 22 13   Temp:       TempSrc:       SpO2: 99% 99% 99% 98%   Weight:       Height:            Patient was given the following medications:   Medications   sodium chloride 0.9 % bolus 500 mL (0 mLs IntraVENous Stopped 6/8/24 1220)             CC/HPI Summary, DDx, ED Course, and Reassessment: The above history and physical exam were performed.  I reviewed her past medical past surgical social family history.  Review of systems performed is negative as otherwise noted.  Her NIHSS was 0 and her HINTS exam was unremarkable.  She was not orthostatic by pulse or pressure.   Her HEART score is 1 for smoking.  She is PERC rule negative so it is extremely unlikely that this is a PE.  Her workup is under remarkable so at this point I think this is mild dehydration as she is mildly orthostatic going from sitting to standing with an elevated heart rate.  She was working in a hot room but does have AC at home.  My diagnosis is dehydration at this point time she is stable for discharge can safely follow-up in outpatient basis with appropriate return precautions    CONSULTS:

## 2024-06-09 LAB
EKG ATRIAL RATE: 72 BPM
EKG DIAGNOSIS: NORMAL
EKG P AXIS: 25 DEGREES
EKG P-R INTERVAL: 132 MS
EKG Q-T INTERVAL: 414 MS
EKG QRS DURATION: 86 MS
EKG QTC CALCULATION (BAZETT): 453 MS
EKG R AXIS: 67 DEGREES
EKG T AXIS: 52 DEGREES
EKG VENTRICULAR RATE: 72 BPM

## 2024-06-09 PROCEDURE — 93010 ELECTROCARDIOGRAM REPORT: CPT | Performed by: INTERNAL MEDICINE

## 2024-08-19 ENCOUNTER — HOSPITAL ENCOUNTER (EMERGENCY)
Age: 29
Discharge: HOME | End: 2024-08-19
Attending: EMERGENCY MEDICINE
Payer: MEDICAID

## 2024-08-19 VITALS
TEMPERATURE: 98.5 F | BODY MASS INDEX: 27.75 KG/M2 | HEIGHT: 61 IN | HEART RATE: 99 BPM | WEIGHT: 147 LBS | SYSTOLIC BLOOD PRESSURE: 125 MMHG | RESPIRATION RATE: 19 BRPM | DIASTOLIC BLOOD PRESSURE: 77 MMHG | OXYGEN SATURATION: 97 %

## 2024-08-19 DIAGNOSIS — U07.1 COVID-19 VIRUS INFECTION: Primary | ICD-10-CM

## 2024-08-19 LAB — SARS-COV-2 RDRP RESP QL NAA+PROBE: DETECTED

## 2024-08-19 PROCEDURE — 99283 EMERGENCY DEPT VISIT LOW MDM: CPT

## 2024-08-19 PROCEDURE — 87635 SARS-COV-2 COVID-19 AMP PRB: CPT

## 2024-08-19 ASSESSMENT — PAIN SCALES - GENERAL
PAINLEVEL_OUTOF10: 6
PAINLEVEL_OUTOF10: 6

## 2024-08-19 ASSESSMENT — PAIN DESCRIPTION - PAIN TYPE: TYPE: ACUTE PAIN

## 2024-08-19 ASSESSMENT — PAIN DESCRIPTION - FREQUENCY: FREQUENCY: CONTINUOUS

## 2024-08-19 ASSESSMENT — PAIN DESCRIPTION - DESCRIPTORS: DESCRIPTORS: ACHING

## 2024-08-19 ASSESSMENT — PAIN DESCRIPTION - LOCATION: LOCATION: BACK

## 2024-08-19 ASSESSMENT — PAIN - FUNCTIONAL ASSESSMENT: PAIN_FUNCTIONAL_ASSESSMENT: 0-10

## 2024-08-19 NOTE — DISCHARGE INSTRUCTIONS
Continue tylenol and motrin for fever and pain. Fluids. Return for chest pain, trouble breathing, persistent vomiting or other worsening symptoms

## 2024-08-19 NOTE — ED NOTES
Patient ambulatory to Room 4 with c/o concern for covid. 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 States she has done 2 home tests and one of them had a \"faint line\" that she thinks is positive. C/O slight cough and congestion that started yesterday. Awake, alert, oriented, respirations easy & regular, skin w/d, cap refill brisk. Patient swabbed for covid and denies further needs at this time.

## 2024-08-19 NOTE — ED NOTES
Discharge instructions reviewed with patient and verbalized understanding, denies further questions and successful teach back occurred. Offered wheelchair for discharge and declined. Discharged ambulatory with steady gait to ED lobby. Written discharge instructions and work note provided to patient.

## 2024-08-19 NOTE — ED PROVIDER NOTES
ability.    Followup:  [facility]    Schedule an appointment as soon as possible for a visit   As needed      Discharge vitals:  Blood pressure 125/77, pulse 99, temperature 98.5 °F (36.9 °C), temperature source Oral, resp. rate 19, height 1.549 m (5' 1\"), weight 66.7 kg (147 lb), SpO2 97%.    Prescriptions given:   New Prescriptions    No medications on file         This chart was created using dragon voice recognition software.        Hannah Leahy MD  08/19/24 [id number]

## 2025-03-28 ENCOUNTER — HOSPITAL ENCOUNTER (EMERGENCY)
Age: 30
Discharge: HOME | End: 2025-03-28
Attending: EMERGENCY MEDICINE
Payer: MEDICAID

## 2025-03-28 VITALS
TEMPERATURE: 98.2 F | RESPIRATION RATE: 15 BRPM | OXYGEN SATURATION: 98 % | HEIGHT: 62 IN | DIASTOLIC BLOOD PRESSURE: 84 MMHG | BODY MASS INDEX: 27.55 KG/M2 | SYSTOLIC BLOOD PRESSURE: 123 MMHG | HEART RATE: 84 BPM | WEIGHT: 149.69 LBS

## 2025-03-28 DIAGNOSIS — K05.10 GINGIVITIS: Primary | ICD-10-CM

## 2025-03-28 PROCEDURE — 99283 EMERGENCY DEPT VISIT LOW MDM: CPT

## 2025-03-28 RX ORDER — CHLORHEXIDINE GLUCONATE ORAL RINSE 1.2 MG/ML
15 SOLUTION DENTAL 2 TIMES DAILY
Qty: 420 ML | Refills: 0 | Status: SHIPPED | OUTPATIENT
Start: 2025-03-28 | End: 2025-04-11

## 2025-03-28 RX ORDER — IBUPROFEN 600 MG/1
600 TABLET, FILM COATED ORAL 3 TIMES DAILY PRN
Qty: 30 TABLET | Refills: 0 | Status: SHIPPED | OUTPATIENT
Start: 2025-03-28

## 2025-03-28 RX ORDER — PENICILLIN V POTASSIUM 500 MG/1
500 TABLET, FILM COATED ORAL 4 TIMES DAILY
Qty: 40 TABLET | Refills: 0 | Status: SHIPPED | OUTPATIENT
Start: 2025-03-28 | End: 2025-04-07

## 2025-03-28 ASSESSMENT — PAIN DESCRIPTION - DESCRIPTORS
DESCRIPTORS: ACHING
DESCRIPTORS: ACHING

## 2025-03-28 ASSESSMENT — ENCOUNTER SYMPTOMS: TROUBLE SWALLOWING: 0

## 2025-03-28 ASSESSMENT — PAIN - FUNCTIONAL ASSESSMENT
PAIN_FUNCTIONAL_ASSESSMENT: 0-10
PAIN_FUNCTIONAL_ASSESSMENT: 0-10

## 2025-03-28 ASSESSMENT — PAIN SCALES - GENERAL
PAINLEVEL_OUTOF10: 5
PAINLEVEL_OUTOF10: 5

## 2025-03-28 ASSESSMENT — PAIN DESCRIPTION - PAIN TYPE
TYPE: ACUTE PAIN
TYPE: ACUTE PAIN

## 2025-03-28 NOTE — DISCHARGE INSTRUCTIONS
You have a dental problem that needs to be seen in a dentist office.  Below is a list of the [facility] clinics that will see you.  They are open at 7 AM Monday through Friday and take a very limited number of emergencies.  You must get there by 530 to 6 in the morning and to ensure that you are one of the emergencies they do that day.  They will not make an appointment.  And if you are there in line but are one of the people at the back of the line, you will not be cared for.    [facility]  [address], [place] [id number] [phone]   Hours are Monday and Thursday 7:00a-1:00p and 2:00-4:00p; Friday 7:00a-1:00p   Emergency walk-ins accepted only on Tuesday, Wednesday, and Friday at 7 am (limited number, be early)  Residency: Resident of [facility]  Must be a resident of the [facility] [place]: Bring proof of this residence (example: utility bill);   Sliding Fee Scale  *Scale requires proof of income (example: pay stub or tax return). Scale is based on family size and income. Discounts can be 25%, 50%, 75%, or 100% of all services.   Minimum Co-pay must be $30 if you have no insurance.  Medicaid, Insurance, Self-Pay    [facility]  [address], [place] [id number] [phone]   Hours are M-Th 7:00a-1:00p and 2:00p-5:00p; F 7:00a-1:30p  Emergency walk-ins accepted Monday through Thursday at 7 am (limited number, be there early)  Residency: Resident of [facility]  Must be a resident of the [facility]: Bring proof of this residence (example: utility bill);   Sliding Fee Scale  *Scale requires proof of income (example: pay stub or tax return). Scale is based on family size and income. Discounts can be 25%, 50%, 75%, or 100% of all services.   Minimum Co-pay must be $30 if you have no insurance.  Medicaid, Insurance, Self-Pay    [facility]  [address] [place] [address] [phone]  Hours are M-Th 7:00a-1:00p and 2:00p-5:00p; F

## 2025-03-28 NOTE — ED PROVIDER NOTES
[facility]  EMERGENCY DEPARTMENT ENCOUNTER      Pt Name: Aristides Carson  MRN: [id number]  Birthdate 1995  Date of evaluation: 3/28/2025  Provider: ADELIA MYERS MD    CHIEF COMPLAINT       Chief Complaint   Patient presents with    Dental Problem     C/o dental abscess front lower gum x 1 month. She had second area started hurting yesterday         HISTORY OF PRESENT ILLNESS   (Location/Symptom, Timing/Onset, Context/Setting, Quality, Duration, Modifying Factors, Severity)  Note limiting factors.   Aristides Carson is a 30 y.o. female with   Past Medical History:   Diagnosis Date    ADHD (attention deficit hyperactivity disorder)     Allergies     Anxiety     Asthma     Depression     who presents to the emergency department with the chief complaint of   Chief Complaint   Patient presents with    Dental Problem     C/o dental abscess front lower gum x 1 month. She had second area started hurting yesterday   . The patient comes in complaining of pain and some swelling along her lower gum right in front of her central incisors bilaterally.  She states been going on for about a month.  She states that she has a history of some anxiety and depression but not currently on any meds.  The patient denies any trouble breathing or swallowing.        Nursing Notes were reviewed.    REVIEW OF SYSTEMS    (2-9 systems for level 4, 10 or more for level 5)     Review of Systems   Constitutional:  Negative for chills and fever.   HENT:  Positive for dental problem. Negative for trouble swallowing.        Except as noted above the remainder of the review of systems was reviewed and negative.       PAST MEDICAL HISTORY     Past Medical History:   Diagnosis Date    ADHD (attention deficit hyperactivity disorder)     Allergies     Anxiety     Asthma     Depression          SURGICAL HISTORY       Past Surgical History:   Procedure Laterality Date    ADENOIDECTOMY      TYMPANOSTOMY TUBE  septic shock?   No   Exclusion criteria - the patient is NOT to be included for SEP-1 Core Measure due to:  2+ SIRS criteria are not met      I am the Primary Clinician of Record.    MDM  Number of Diagnoses or Management Options  Diagnosis management comments: The patient was felt to have gingivitis maybe early apical abscess but I think is more of a gingivitis.  She certainly can be treated as an outpatient.  I am referred to dental and advised her to get their Monday morning.  I think she be safe discharged home and she was very comfortable with that plan          FINAL IMPRESSION      1. Gingivitis          DISPOSITION/PLAN   DISPOSITION Decision To Discharge 03/28/2025 03:29:31 PM      PATIENT REFERRED TO:  No follow-up provider specified.    DISCHARGE MEDICATIONS:  New Prescriptions    CHLORHEXIDINE (PERIDEX) 0.12 % SOLUTION    Swish and spit 15 mLs 2 times daily for 14 days    IBUPROFEN (ADVIL;MOTRIN) 600 MG TABLET    Take 1 tablet by mouth 3 times daily as needed for Pain    PENICILLIN V POTASSIUM (VEETID) 500 MG TABLET    Take 1 tablet by mouth 4 times daily for 10 days     Controlled Substances Monitoring:          No data to display                (Please note that portions of this note were completed with a voice recognition program.  Efforts were made to edit the dictations but occasionally words are mis-transcribed.)    ADELIA MYERS MD (electronically signed)  Attending Emergency Physician           Adelia Myers II, MD  03/28/25 [id number]

## 2025-06-10 ENCOUNTER — APPOINTMENT (OUTPATIENT)
Dept: GENERAL RADIOLOGY | Age: 30
End: 2025-06-10
Payer: MEDICAID

## 2025-06-10 ENCOUNTER — HOSPITAL ENCOUNTER (EMERGENCY)
Age: 30
Discharge: HOME | End: 2025-06-10
Attending: EMERGENCY MEDICINE
Payer: MEDICAID

## 2025-06-10 VITALS
OXYGEN SATURATION: 99 % | SYSTOLIC BLOOD PRESSURE: 111 MMHG | TEMPERATURE: 98.2 F | WEIGHT: 159.83 LBS | HEART RATE: 67 BPM | DIASTOLIC BLOOD PRESSURE: 57 MMHG | HEIGHT: 62 IN | RESPIRATION RATE: 16 BRPM | BODY MASS INDEX: 29.41 KG/M2

## 2025-06-10 DIAGNOSIS — S32.2XXA CLOSED FRACTURE OF COCCYX, INITIAL ENCOUNTER (HCC): Primary | ICD-10-CM

## 2025-06-10 LAB — HCG UR QL: NEGATIVE

## 2025-06-10 PROCEDURE — 6370000000 HC RX 637 (ALT 250 FOR IP): Performed by: EMERGENCY MEDICINE

## 2025-06-10 PROCEDURE — 99284 EMERGENCY DEPT VISIT MOD MDM: CPT

## 2025-06-10 PROCEDURE — 84703 CHORIONIC GONADOTROPIN ASSAY: CPT

## 2025-06-10 PROCEDURE — 72220 X-RAY EXAM SACRUM TAILBONE: CPT

## 2025-06-10 RX ORDER — ACETAMINOPHEN 325 MG/1
650 TABLET ORAL ONCE
Status: COMPLETED | OUTPATIENT
Start: 2025-06-10 | End: 2025-06-10

## 2025-06-10 RX ADMIN — ACETAMINOPHEN 650 MG: 325 TABLET ORAL at 17:02

## 2025-06-10 ASSESSMENT — PAIN DESCRIPTION - LOCATION: LOCATION: BUTTOCKS

## 2025-06-10 ASSESSMENT — PAIN DESCRIPTION - DESCRIPTORS: DESCRIPTORS: ACHING

## 2025-06-10 ASSESSMENT — PAIN SCALES - GENERAL
PAINLEVEL_OUTOF10: 3
PAINLEVEL_OUTOF10: 3

## 2025-06-10 ASSESSMENT — PAIN - FUNCTIONAL ASSESSMENT: PAIN_FUNCTIONAL_ASSESSMENT: 0-10

## 2025-06-10 ASSESSMENT — PAIN DESCRIPTION - ORIENTATION: ORIENTATION: RIGHT

## 2025-06-10 ASSESSMENT — PAIN DESCRIPTION - PAIN TYPE: TYPE: ACUTE PAIN

## 2025-06-10 NOTE — ED PROVIDER NOTES
Patient instructed to follow up with:   [facility]      As needed    [facility]  [address] [address]  [phone]    As needed, if symptoms worsen      All questions were answered and the patient/family expressed understanding and agreement with the plan.     PROCEDURES  None    CRITICAL CARE  N/A    CLINICAL IMPRESSION  1. Closed fracture of coccyx, initial encounter (HCC)        DISPOSITION  Decision To Discharge 06/10/2025 06:19:19 PM     Garrett Lugo MD    Note: This chart was created using voice recognition dictation software. Efforts were made by me to ensure accuracy, however some errors may be present due to limitations of this technology and occasionally words are not transcribed correctly.      Garrett Lugo MD  06/10/25 [id number]

## 2025-06-10 NOTE — DISCHARGE INSTRUCTIONS
Dear Aristides Carson,     Thank you for the privilege of caring for you today in the Emergency Department.         Take ibuprofen, up to 600 mg four times per day every day with food for the next 3-5 days, then as needed and directed on the bottle for continued pain.You may alternate this with up to 1000 mg of acetaminophen (Tylenol), every six hours. Do not take more than 4000 mg of acetaminophen from all sources in a 24-hour period.      Please return to the Emergency Department if you develop any new or worsening symptoms, weakness, numbness, change in bowel/bladder habits, or for any other concerns.     Regards,     Garrett Lugo MD, FACEP